# Patient Record
Sex: MALE | Race: WHITE | NOT HISPANIC OR LATINO | Employment: UNEMPLOYED | URBAN - METROPOLITAN AREA
[De-identification: names, ages, dates, MRNs, and addresses within clinical notes are randomized per-mention and may not be internally consistent; named-entity substitution may affect disease eponyms.]

---

## 2018-04-05 ENCOUNTER — HOSPITAL ENCOUNTER (EMERGENCY)
Facility: HOSPITAL | Age: 38
Discharge: HOME | End: 2018-04-05
Payer: OTHER MISCELLANEOUS

## 2018-04-05 ENCOUNTER — HOSPITAL ENCOUNTER (EMERGENCY)
Facility: HOSPITAL | Age: 38
Discharge: ANOTHER HOSPITAL | End: 2018-04-05
Attending: FAMILY MEDICINE
Payer: OTHER MISCELLANEOUS

## 2018-04-05 VITALS
WEIGHT: 175 LBS | HEART RATE: 60 BPM | OXYGEN SATURATION: 99 % | SYSTOLIC BLOOD PRESSURE: 134 MMHG | BODY MASS INDEX: 27.47 KG/M2 | DIASTOLIC BLOOD PRESSURE: 95 MMHG | HEIGHT: 67 IN | RESPIRATION RATE: 16 BRPM | TEMPERATURE: 98 F

## 2018-04-05 VITALS
HEIGHT: 69 IN | SYSTOLIC BLOOD PRESSURE: 121 MMHG | RESPIRATION RATE: 20 BRPM | HEART RATE: 57 BPM | WEIGHT: 160 LBS | TEMPERATURE: 98 F | BODY MASS INDEX: 23.7 KG/M2 | DIASTOLIC BLOOD PRESSURE: 87 MMHG

## 2018-04-05 DIAGNOSIS — S41.112A LACERATION OF LEFT UPPER EXTREMITY, INITIAL ENCOUNTER: ICD-10-CM

## 2018-04-05 DIAGNOSIS — S52.002C: ICD-10-CM

## 2018-04-05 DIAGNOSIS — Z01.810 PREOP CARDIOVASCULAR EXAM: Primary | ICD-10-CM

## 2018-04-05 DIAGNOSIS — S52.092B OTHER TYPE I OR II OPEN FRACTURE OF PROXIMAL END OF LEFT ULNA, INITIAL ENCOUNTER: Primary | ICD-10-CM

## 2018-04-05 DIAGNOSIS — S51.012A: ICD-10-CM

## 2018-04-05 LAB
ALBUMIN SERPL BCP-MCNC: 4.4 G/DL
ALBUMIN SERPL BCP-MCNC: 4.6 G/DL
ALP SERPL-CCNC: 102 U/L
ALP SERPL-CCNC: 128 U/L
ALT SERPL W/O P-5'-P-CCNC: 28 U/L
ALT SERPL W/O P-5'-P-CCNC: 30 U/L
ANION GAP SERPL CALC-SCNC: 10 MMOL/L
ANION GAP SERPL CALC-SCNC: 8 MMOL/L
APTT BLDCRRT: 29.2 SEC
AST SERPL-CCNC: 32 U/L
AST SERPL-CCNC: 35 U/L
BASOPHILS # BLD AUTO: 0.05 K/UL
BASOPHILS # BLD AUTO: 0.05 K/UL
BASOPHILS NFR BLD: 0.3 %
BASOPHILS NFR BLD: 0.4 %
BILIRUB SERPL-MCNC: 0.5 MG/DL
BILIRUB SERPL-MCNC: 0.5 MG/DL
BUN SERPL-MCNC: 14 MG/DL
BUN SERPL-MCNC: 16 MG/DL
CALCIUM SERPL-MCNC: 10 MG/DL
CALCIUM SERPL-MCNC: 9.8 MG/DL
CHLORIDE SERPL-SCNC: 101 MMOL/L
CHLORIDE SERPL-SCNC: 104 MMOL/L
CO2 SERPL-SCNC: 25 MMOL/L
CO2 SERPL-SCNC: 25 MMOL/L
CREAT SERPL-MCNC: 1 MG/DL
CREAT SERPL-MCNC: 1 MG/DL
DIFFERENTIAL METHOD: ABNORMAL
DIFFERENTIAL METHOD: ABNORMAL
EOSINOPHIL # BLD AUTO: 0 K/UL
EOSINOPHIL # BLD AUTO: 0 K/UL
EOSINOPHIL NFR BLD: 0 %
EOSINOPHIL NFR BLD: 0 %
ERYTHROCYTE [DISTWIDTH] IN BLOOD BY AUTOMATED COUNT: 12.2 %
ERYTHROCYTE [DISTWIDTH] IN BLOOD BY AUTOMATED COUNT: 12.5 %
EST. GFR  (AFRICAN AMERICAN): >60 ML/MIN/1.73 M^2
EST. GFR  (AFRICAN AMERICAN): >60 ML/MIN/1.73 M^2
EST. GFR  (NON AFRICAN AMERICAN): >60 ML/MIN/1.73 M^2
EST. GFR  (NON AFRICAN AMERICAN): >60 ML/MIN/1.73 M^2
GLUCOSE SERPL-MCNC: 82 MG/DL
GLUCOSE SERPL-MCNC: 96 MG/DL
HCT VFR BLD AUTO: 43.1 %
HCT VFR BLD AUTO: 43.7 %
HGB BLD-MCNC: 15.1 G/DL
HGB BLD-MCNC: 15.1 G/DL
IMM GRANULOCYTES # BLD AUTO: 0.03 K/UL
IMM GRANULOCYTES # BLD AUTO: 0.07 K/UL
IMM GRANULOCYTES NFR BLD AUTO: 0.3 %
IMM GRANULOCYTES NFR BLD AUTO: 0.5 %
INR PPP: 1
INR PPP: 1.1
LYMPHOCYTES # BLD AUTO: 2.8 K/UL
LYMPHOCYTES # BLD AUTO: 3.3 K/UL
LYMPHOCYTES NFR BLD: 18.7 %
LYMPHOCYTES NFR BLD: 28.9 %
MAGNESIUM SERPL-MCNC: 2.1 MG/DL
MCH RBC QN AUTO: 31.2 PG
MCH RBC QN AUTO: 31.3 PG
MCHC RBC AUTO-ENTMCNC: 34.6 G/DL
MCHC RBC AUTO-ENTMCNC: 35 G/DL
MCV RBC AUTO: 89 FL
MCV RBC AUTO: 91 FL
MONOCYTES # BLD AUTO: 0.6 K/UL
MONOCYTES # BLD AUTO: 0.8 K/UL
MONOCYTES NFR BLD: 5 %
MONOCYTES NFR BLD: 5.1 %
NEUTROPHILS # BLD AUTO: 11.3 K/UL
NEUTROPHILS # BLD AUTO: 7.6 K/UL
NEUTROPHILS NFR BLD: 65.3 %
NEUTROPHILS NFR BLD: 75.5 %
NRBC BLD-RTO: 0 /100 WBC
NRBC BLD-RTO: 0 /100 WBC
PHOSPHATE SERPL-MCNC: 2.9 MG/DL
PLATELET # BLD AUTO: 242 K/UL
PLATELET # BLD AUTO: 248 K/UL
PMV BLD AUTO: 9.8 FL
PMV BLD AUTO: 9.9 FL
POTASSIUM SERPL-SCNC: 3.6 MMOL/L
POTASSIUM SERPL-SCNC: 4.4 MMOL/L
PREALB SERPL-MCNC: 34 MG/DL
PROT SERPL-MCNC: 7.5 G/DL
PROT SERPL-MCNC: 7.8 G/DL
PROTHROMBIN TIME: 10.9 SEC
PROTHROMBIN TIME: 12.9 SEC
RBC # BLD AUTO: 4.82 M/UL
RBC # BLD AUTO: 4.84 M/UL
SODIUM SERPL-SCNC: 134 MMOL/L
SODIUM SERPL-SCNC: 139 MMOL/L
TRANSFERRIN SERPL-MCNC: 254 MG/DL
WBC # BLD AUTO: 11.56 K/UL
WBC # BLD AUTO: 14.96 K/UL

## 2018-04-05 PROCEDURE — 85730 THROMBOPLASTIN TIME PARTIAL: CPT

## 2018-04-05 PROCEDURE — 36415 COLL VENOUS BLD VENIPUNCTURE: CPT

## 2018-04-05 PROCEDURE — 96366 THER/PROPH/DIAG IV INF ADDON: CPT | Mod: 59

## 2018-04-05 PROCEDURE — 73090 X-RAY EXAM OF FOREARM: CPT | Mod: TC,FY,LT

## 2018-04-05 PROCEDURE — 93010 ELECTROCARDIOGRAM REPORT: CPT | Mod: ,,, | Performed by: INTERNAL MEDICINE

## 2018-04-05 PROCEDURE — 84100 ASSAY OF PHOSPHORUS: CPT

## 2018-04-05 PROCEDURE — 12005 RPR S/N/A/GEN/TRK12.6-20.0CM: CPT

## 2018-04-05 PROCEDURE — 85610 PROTHROMBIN TIME: CPT | Mod: 91

## 2018-04-05 PROCEDURE — 73080 X-RAY EXAM OF ELBOW: CPT | Mod: 26,LT,, | Performed by: RADIOLOGY

## 2018-04-05 PROCEDURE — 83735 ASSAY OF MAGNESIUM: CPT

## 2018-04-05 PROCEDURE — 84134 ASSAY OF PREALBUMIN: CPT

## 2018-04-05 PROCEDURE — 90471 IMMUNIZATION ADMIN: CPT | Performed by: FAMILY MEDICINE

## 2018-04-05 PROCEDURE — 85025 COMPLETE CBC W/AUTO DIFF WBC: CPT | Mod: 91

## 2018-04-05 PROCEDURE — 85610 PROTHROMBIN TIME: CPT

## 2018-04-05 PROCEDURE — 63600175 PHARM REV CODE 636 W HCPCS: Performed by: STUDENT IN AN ORGANIZED HEALTH CARE EDUCATION/TRAINING PROGRAM

## 2018-04-05 PROCEDURE — 93005 ELECTROCARDIOGRAM TRACING: CPT

## 2018-04-05 PROCEDURE — 25000003 PHARM REV CODE 250: Performed by: STUDENT IN AN ORGANIZED HEALTH CARE EDUCATION/TRAINING PROGRAM

## 2018-04-05 PROCEDURE — 96376 TX/PRO/DX INJ SAME DRUG ADON: CPT

## 2018-04-05 PROCEDURE — 96374 THER/PROPH/DIAG INJ IV PUSH: CPT

## 2018-04-05 PROCEDURE — S0077 INJECTION, CLINDAMYCIN PHOSP: HCPCS | Performed by: STUDENT IN AN ORGANIZED HEALTH CARE EDUCATION/TRAINING PROGRAM

## 2018-04-05 PROCEDURE — 80053 COMPREHEN METABOLIC PANEL: CPT

## 2018-04-05 PROCEDURE — 99285 EMERGENCY DEPT VISIT HI MDM: CPT | Mod: 25

## 2018-04-05 PROCEDURE — 96375 TX/PRO/DX INJ NEW DRUG ADDON: CPT | Mod: 59

## 2018-04-05 PROCEDURE — 73090 X-RAY EXAM OF FOREARM: CPT | Mod: 26,LT,, | Performed by: RADIOLOGY

## 2018-04-05 PROCEDURE — 84466 ASSAY OF TRANSFERRIN: CPT

## 2018-04-05 PROCEDURE — 25000003 PHARM REV CODE 250: Performed by: SURGERY

## 2018-04-05 PROCEDURE — 85025 COMPLETE CBC W/AUTO DIFF WBC: CPT

## 2018-04-05 PROCEDURE — 63600175 PHARM REV CODE 636 W HCPCS: Performed by: FAMILY MEDICINE

## 2018-04-05 PROCEDURE — 63600175 PHARM REV CODE 636 W HCPCS

## 2018-04-05 PROCEDURE — 99285 EMERGENCY DEPT VISIT HI MDM: CPT | Mod: 25,27

## 2018-04-05 PROCEDURE — 12035 INTMD RPR S/A/T/EXT 12.6-20: CPT

## 2018-04-05 PROCEDURE — 73080 X-RAY EXAM OF ELBOW: CPT | Mod: TC,FY,LT

## 2018-04-05 PROCEDURE — 96367 TX/PROPH/DG ADDL SEQ IV INF: CPT | Mod: 59

## 2018-04-05 PROCEDURE — 80053 COMPREHEN METABOLIC PANEL: CPT | Mod: 91

## 2018-04-05 PROCEDURE — 90714 TD VACC NO PRESV 7 YRS+ IM: CPT | Performed by: FAMILY MEDICINE

## 2018-04-05 PROCEDURE — 96365 THER/PROPH/DIAG IV INF INIT: CPT | Mod: 59

## 2018-04-05 RX ORDER — HYDROCODONE BITARTRATE AND ACETAMINOPHEN 5; 325 MG/1; MG/1
1 TABLET ORAL EVERY 4 HOURS PRN
Qty: 24 TABLET | Refills: 0 | Status: SHIPPED | OUTPATIENT
Start: 2018-04-05 | End: 2018-10-06

## 2018-04-05 RX ORDER — LIDOCAINE HYDROCHLORIDE 10 MG/ML
20 INJECTION INFILTRATION; PERINEURAL ONCE
Status: COMPLETED | OUTPATIENT
Start: 2018-04-05 | End: 2018-04-05

## 2018-04-05 RX ORDER — MORPHINE SULFATE 4 MG/ML
4 INJECTION, SOLUTION INTRAMUSCULAR; INTRAVENOUS
Status: COMPLETED | OUTPATIENT
Start: 2018-04-05 | End: 2018-04-05

## 2018-04-05 RX ORDER — HYDROCODONE BITARTRATE AND ACETAMINOPHEN 10; 325 MG/1; MG/1
1 TABLET ORAL ONCE
Status: COMPLETED | OUTPATIENT
Start: 2018-04-05 | End: 2018-04-05

## 2018-04-05 RX ORDER — CLINDAMYCIN HYDROCHLORIDE 150 MG/1
300 CAPSULE ORAL 4 TIMES DAILY
Qty: 80 CAPSULE | Refills: 0 | Status: SHIPPED | OUTPATIENT
Start: 2018-04-05 | End: 2018-04-05

## 2018-04-05 RX ORDER — HYDROMORPHONE HYDROCHLORIDE 2 MG/ML
0.5 INJECTION, SOLUTION INTRAMUSCULAR; INTRAVENOUS; SUBCUTANEOUS
Status: COMPLETED | OUTPATIENT
Start: 2018-04-05 | End: 2018-04-05

## 2018-04-05 RX ORDER — CLINDAMYCIN PHOSPHATE 900 MG/50ML
900 INJECTION, SOLUTION INTRAVENOUS
Status: DISCONTINUED | OUTPATIENT
Start: 2018-04-05 | End: 2018-04-05 | Stop reason: HOSPADM

## 2018-04-05 RX ORDER — FENTANYL CITRATE 50 UG/ML
50 INJECTION, SOLUTION INTRAMUSCULAR; INTRAVENOUS
Status: DISCONTINUED | OUTPATIENT
Start: 2018-04-05 | End: 2018-04-05

## 2018-04-05 RX ORDER — CLINDAMYCIN HYDROCHLORIDE 150 MG/1
450 CAPSULE ORAL 4 TIMES DAILY
Qty: 120 CAPSULE | Refills: 0 | Status: SHIPPED | OUTPATIENT
Start: 2018-04-05 | End: 2018-04-15

## 2018-04-05 RX ADMIN — HYDROCODONE BITARTRATE AND ACETAMINOPHEN 1 TABLET: 10; 325 TABLET ORAL at 09:04

## 2018-04-05 RX ADMIN — HYDROMORPHONE HYDROCHLORIDE 0.5 MG: 2 INJECTION, SOLUTION INTRAMUSCULAR; INTRAVENOUS; SUBCUTANEOUS at 12:04

## 2018-04-05 RX ADMIN — TETANUS AND DIPHTHERIA TOXOIDS ADSORBED 0.5 ML: 2; 2 INJECTION INTRAMUSCULAR at 03:04

## 2018-04-05 RX ADMIN — GENTAMICIN SULFATE 142.8 MG: 40 INJECTION, SOLUTION INTRAMUSCULAR; INTRAVENOUS at 08:04

## 2018-04-05 RX ADMIN — CLINDAMYCIN IN 5 PERCENT DEXTROSE 900 MG: 18 INJECTION, SOLUTION INTRAVENOUS at 06:04

## 2018-04-05 RX ADMIN — MORPHINE SULFATE 4 MG: 4 INJECTION INTRAVENOUS at 06:04

## 2018-04-05 RX ADMIN — LIDOCAINE HYDROCHLORIDE 20 ML: 10 INJECTION, SOLUTION INFILTRATION; PERINEURAL at 07:04

## 2018-04-05 RX ADMIN — HYDROMORPHONE HYDROCHLORIDE 0.5 MG: 2 INJECTION, SOLUTION INTRAMUSCULAR; INTRAVENOUS; SUBCUTANEOUS at 03:04

## 2018-04-05 NOTE — ED PROVIDER NOTES
Encounter Date: 4/5/2018       History     Chief Complaint   Patient presents with    Laceration     Pt presented to the ED via [facility]. Pt is a transfer from [facility] for lacteration to the left arm from a skill saw.      Anshul Mayes is a 38 y.o. male with no reported significant past medical history who presented as an ED transfer from [place] with a left elbow laceration and ulna fracture secondary to circular saw accident.  Patient reports another person was using the saw and after cutting a board swung the saw backwards hitting the patient in the arm.  His finger was not depressing the trigger but the blade was still spinning.  This occurred approximately 6 hours prior to arrival at [facility] emergency department.            Review of patient's allergies indicates:   Allergen Reactions    Penicillins Anaphylaxis    Fentanyl Nausea Only     Past Medical History:   Diagnosis Date    Hypertension      Past Surgical History:   Procedure Laterality Date    EYE SURGERY      KNEE SURGERY Left      History reviewed. No pertinent family history.  Social History   Substance Use Topics    Smoking status: Current Every Day Smoker     Packs/day: 1.00     Years: 30.00     Types: Cigarettes    Smokeless tobacco: Never Used    Alcohol use No     Review of Systems   Constitutional: Negative for activity change, chills and fever.   HENT: Negative for congestion, facial swelling, sinus pressure and tinnitus.    Eyes: Negative for photophobia and visual disturbance.   Respiratory: Negative for cough and shortness of breath.    Cardiovascular: Negative for chest pain, palpitations and leg swelling.   Gastrointestinal: Negative for abdominal distention, abdominal pain, nausea and vomiting.   Genitourinary: Negative for difficulty urinating and dysuria.   Musculoskeletal: Positive for arthralgias and myalgias.   Skin: Positive for wound.   Neurological: Negative for dizziness, facial asymmetry, weakness and  numbness.   Hematological: Negative for adenopathy. Does not bruise/bleed easily.   Psychiatric/Behavioral: Negative for agitation and behavioral problems.       Physical Exam     Initial Vitals [04/05/18 1732]   BP Pulse Resp Temp SpO2   (!) 134/95 60 16 98.4 °F (36.9 °C) 99 %      MAP       108         Physical Exam    Constitutional: He appears well-developed and well-nourished.   HENT:   Head: Normocephalic and atraumatic.   Eyes: EOM are normal. Pupils are equal, round, and reactive to light.   Neck: Normal range of motion.   Cardiovascular: Normal rate and regular rhythm. Exam reveals no gallop and no friction rub.    No murmur heard.  Pulmonary/Chest: Breath sounds normal. No respiratory distress. He has no rhonchi. He has no rales.   Abdominal: Soft. He exhibits no distension. There is no tenderness.   Musculoskeletal:   Left arm dressed.  Dressing taken down revealing approximately 15 cm x 1 cm laceration to the left extensor surface 8 cm distal to elbow.  No apparent expose bone or tendon.  Patient remains neurovascularly intact   Neurological: He is alert and oriented to person, place, and time. No cranial nerve deficit or sensory deficit.   Skin: Skin is warm and dry. Capillary refill takes less than 2 seconds.   Psychiatric: He has a normal mood and affect. Thought content normal.         ED Course   Lac Repair  Date/Time: 4/5/2018 9:07 PM  Performed by: RAYMOND CARDENAS  Authorized by: HERVE RODRIGUEZ   Body area: upper extremity  Location details: left lower arm  Laceration length: 15 cm  Tendon involvement: none  Nerve involvement: none  Vascular damage: no  Anesthesia: local infiltration    Anesthesia:  Local Anesthetic: lidocaine 1% without epinephrine  Patient sedated: no  Irrigation solution: saline  Irrigation method: jet lavage  Amount of cleaning: standard  Debridement: none  Degree of undermining: minimal  Wound skin closure material used: 2-0 nylon.  Number of sutures: 16  Technique:  simple  Approximation: close  Approximation difficulty: simple  Dressing: 4x4 sterile gauze, splint for protection and Xeroform gauze  Patient tolerance: Patient tolerated the procedure well with no immediate complications        Labs Reviewed   COMPREHENSIVE METABOLIC PANEL   CBC W/ AUTO DIFFERENTIAL   MAGNESIUM   PHOSPHORUS   PREALBUMIN   TRANSFERRIN   PROTIME-INR     Results for orders placed or performed during the hospital encounter of 04/05/18   Comprehensive metabolic panel   Result Value Ref Range    Sodium 139 136 - 145 mmol/L    Potassium 4.4 3.5 - 5.1 mmol/L    Chloride 104 95 - 110 mmol/L    CO2 25 23 - 29 mmol/L    Glucose 82 70 - 110 mg/dL    BUN, Bld 14 6 - 20 mg/dL    Creatinine 1.0 0.5 - 1.4 mg/dL    Calcium 10.0 8.7 - 10.5 mg/dL    Total Protein 7.5 6.0 - 8.4 g/dL    Albumin 4.4 3.5 - 5.2 g/dL    Total Bilirubin 0.5 0.1 - 1.0 mg/dL    Alkaline Phosphatase 128 55 - 135 U/L    AST 32 10 - 40 U/L    ALT 28 10 - 44 U/L    Anion Gap 10 8 - 16 mmol/L    eGFR if African American >60.0 >60 mL/min/1.73 m^2    eGFR if non African American >60.0 >60 mL/min/1.73 m^2   CBC auto differential   Result Value Ref Range    WBC 14.96 (H) 3.90 - 12.70 K/uL    RBC 4.82 4.60 - 6.20 M/uL    Hemoglobin 15.1 14.0 - 18.0 g/dL    Hematocrit 43.7 40.0 - 54.0 %    MCV 91 82 - 98 fL    MCH 31.3 (H) 27.0 - 31.0 pg    MCHC 34.6 32.0 - 36.0 g/dL    RDW 12.5 11.5 - 14.5 %    Platelets 248 150 - 350 K/uL    MPV 9.8 9.2 - 12.9 fL    Immature Granulocytes 0.5 0.0 - 0.5 %    Gran # (ANC) 11.3 (H) 1.8 - 7.7 K/uL    Immature Grans (Abs) 0.07 (H) 0.00 - 0.04 K/uL    Lymph # 2.8 1.0 - 4.8 K/uL    Mono # 0.8 0.3 - 1.0 K/uL    Eos # 0.0 0.0 - 0.5 K/uL    Baso # 0.05 0.00 - 0.20 K/uL    nRBC 0 0 /100 WBC    Gran% 75.5 (H) 38.0 - 73.0 %    Lymph% 18.7 18.0 - 48.0 %    Mono% 5.0 4.0 - 15.0 %    Eosinophil% 0.0 0.0 - 8.0 %    Basophil% 0.3 0.0 - 1.9 %    Differential Method Automated    Magnesium   Result Value Ref Range    Magnesium 2.1 1.6 -  2.6 mg/dL   Phosphorus   Result Value Ref Range    Phosphorus 2.9 2.7 - 4.5 mg/dL   Prealbumin   Result Value Ref Range    Prealbumin 34 20 - 43 mg/dL   Transferrin   Result Value Ref Range    Transferrin 254 200 - 375 mg/dL   Protime-INR   Result Value Ref Range    Prothrombin Time 10.9 9.0 - 12.5 sec    INR 1.0 0.8 - 1.2     Imaging Results          X-Ray Chest 1 View Pre-OP (Final result)  Result time 04/05/18 19:25:03    Final result by Davin Dumont MD (04/05/18 19:25:03)                 Impression:      No acute cardiopulmonary finding.      Electronically signed by: Daivn Dumont MD  Date:    04/05/2018  Time:    19:25             Narrative:    EXAMINATION:  XR CHEST 1 VIEW PRE-OP    CLINICAL HISTORY:  Preop;    TECHNIQUE:  Single frontal view of the chest was performed.    COMPARISON:  None    FINDINGS:  Cardiac silhouette is not enlarged.  No focal consolidation.  No sizable pleural effusion.  No pneumothorax.                               X-Ray Forearm Left (Final result)  Result time 04/05/18 19:22:27    Final result by Joseph Abdalla MD (04/05/18 19:22:27)                 Impression:      1. Soft tissue deformity involving the dorsal aspect of the proximal forearm with underlying osseous injury/disruption.  No dislocation.      Electronically signed by: Joseph Abdalla MD  Date:    04/05/2018  Time:    19:22             Narrative:    EXAMINATION:  XR FOREARM LEFT    CLINICAL HISTORY:  pain;    TECHNIQUE:  AP and lateral views of the left forearm were performed.    COMPARISON:  None    FINDINGS:  Two views.    Bandaging material overlies the proximal forearm, limiting evaluation.  There is marked soft tissue disruption involving the dorsal aspect of the proximal forearm.  There is an osseous defect involving the proximal aspect of the ulna.  No dislocation.  No convincing radiopaque foreign body.                               X-Ray Wrist Complete Left (Final result)  Result time 04/05/18  19:23:49    Final result by Joseph Abdalla MD (04/05/18 19:23:49)                 Impression:      No acute displaced fracture or dislocation of the wrist, please see above for details of the scaphoid.      Electronically signed by: Joseph Abdalla MD  Date:    04/05/2018  Time:    19:23             Narrative:    EXAMINATION:  XR WRIST COMPLETE 3 VIEWS LEFT    CLINICAL HISTORY:  pain;    TECHNIQUE:  PA, lateral, and oblique views of the left wrist were performed.    COMPARISON:  None    FINDINGS:  Three views.    No acute displaced fracture or dislocation of the wrist.  No radiopaque foreign body.  There is a relatively radiolucent lesion within the thenar aspect of the scaphoid, that approaches the cortical surface,, could reflect cyst or related to degenerative change.  No significant edema.                               X-Ray Elbow Complete Left (Final result)  Result time 04/05/18 19:24:49    Final result by Joseph Abdalla MD (04/05/18 19:24:49)                 Impression:      As above      Electronically signed by: Joseph Abdalla MD  Date:    04/05/2018  Time:    19:24             Narrative:    EXAMINATION:  XR ELBOW COMPLETE 3 VIEW LEFT    CLINICAL HISTORY:  pain;    TECHNIQUE:  AP, lateral, and oblique views of the left elbow were performed.    COMPARISON:  None    FINDINGS:  Three views.    There is a cortical defect involving the proximal aspect of the ulna dorsally, in the region of soft tissue disruption.  No dislocation of the elbow.  Evaluation for radiopaque foreign body is limited given overlying bandaging.  No acute displaced fracture or dislocation allowing for ulnar defect.                                            APC / Resident Notes:   Anshul Mayes is a 38 y.o. male presenting as a transfer from [place] after sustaining large laceration to the extensor surface of the left forearm with underlying disruption of the cortex of the ulna.  Bleeding controlled.  Orthopaedic surgery  consulted and evaluated the patient.  Started on clindamycin and gentamicin due to penicillin allergy.  Preoperative labs ordered by Orthopaedic surgery.      9:03 PM  XR reviewed by ortho and felt that the injury did not require operative management at this time.  Wound was anesthetized with 22 cc of 1% lidocaine.  Wound was then scrubbed with gauze and betadine solution then irrigated with 4 L of sterile water.  Wound closed with 2-0 nylon interrupted sutures.  Patient to be provided with 10 days of clindamycin and 24 Norco 5/325 upon discharge.  Instructed to follow up with orthopedist within 7 days.  Patient voiced understanding.       I have reviewed and concur with the Resident's  history, physical, assessment, and plan.  I have personally interviewed and examined the patient at bedside.  The pt is here with deep laceration of left forearm down through cortex of left proximal ulna 2/2 chainsaw injury.  Consulted to orthopedics who assisted with copious irrigation at the bedside and suture closure of the wound.  Patient discharged on antibiotics to follow-up in orthopedic clinic in 7 days.  I discussed the physical findings, lab findings, radiological findings diagnosis and plan with the patient and they have expressed understanding and agreement with this management.      Attending Attestation:   Physician Attestation Statement for Resident:  As the supervising MD I was personally present during the critical portions of the procedure(s) performed by the resident and was immediately available in the ED to provide services and assistance as needed during the entire procedure.                       Clinical Impression:   The primary encounter diagnosis was Preop cardiovascular exam. Diagnoses of Laceration of left upper extremity, initial encounter and Type III open fracture of proximal end of left ulna, unspecified fracture morphology, initial encounter were also pertinent to this visit.    Disposition:    Disposition: Discharged  Condition: Stable                        Lashaun Veronica MD  04/05/18 8400

## 2018-04-05 NOTE — ED TRIAGE NOTES
Pt transfer from [facility], states he was at working holding a board that was being cut with a skill saw and his boss swung the saw backwards and cut him. Wrap dressing around laceration

## 2018-04-05 NOTE — ED PROVIDER NOTES
Encounter Date: 4/5/2018       History     Chief Complaint   Patient presents with    Laceration     Pt cut left forearm with a circular saw just PTA. Only complaints is pain at laceration site. Bleeding controlled.           Review of patient's allergies indicates:   Allergen Reactions    Penicillins Anaphylaxis     Past Medical History:   Diagnosis Date    Hypertension      Past Surgical History:   Procedure Laterality Date    EYE SURGERY      KNEE SURGERY Left      No family history on file.  Social History   Substance Use Topics    Smoking status: Current Every Day Smoker     Packs/day: 1.00     Types: Cigarettes    Smokeless tobacco: Never Used    Alcohol use No     Review of Systems   Constitutional: Negative.    HENT: Negative.    Eyes: Negative.    Respiratory: Negative.    Cardiovascular: Negative.    Gastrointestinal: Negative.    Endocrine: Negative.    Genitourinary: Negative.    Musculoskeletal: Negative.    Skin:        Laceration to arm   Neurological: Negative.    Hematological: Negative.    Psychiatric/Behavioral: Negative.        Physical Exam     Initial Vitals   BP Pulse Resp Temp SpO2   -- -- -- -- --      MAP       --         Physical Exam    Constitutional: He appears well-developed and well-nourished. He is not diaphoretic. No distress.   HENT:   Head: Normocephalic and atraumatic.   Eyes: Conjunctivae and EOM are normal. Pupils are equal, round, and reactive to light.   Neck: Normal range of motion. Neck supple.   Cardiovascular: Normal rate, regular rhythm and normal heart sounds.   Pulmonary/Chest: Breath sounds normal. No respiratory distress.   Abdominal: Soft. Bowel sounds are normal.   Musculoskeletal:   Approx. 12 inch laceration to left forearm extending into muscle belly. No exposed tendon noted, bleeding minimal. No exposed bone seen.    Skin: Capillary refill takes less than 2 seconds.   Psychiatric: He has a normal mood and affect. His behavior is normal. Judgment and  thought content normal.         ED Course   Procedures  Labs Reviewed   CBC W/ AUTO DIFFERENTIAL - Abnormal; Notable for the following:        Result Value    MCH 31.2 (*)     All other components within normal limits   COMPREHENSIVE METABOLIC PANEL - Abnormal; Notable for the following:     Sodium 134 (*)     All other components within normal limits   PROTIME-INR - Abnormal; Notable for the following:     Prothrombin Time 12.9 (*)     All other components within normal limits   APTT         Imaging Results          X-Ray Elbow Complete Left (Final result)  Result time 04/05/18 13:47:29    Final result by Edward Odell MD (04/05/18 13:47:29)                 Narrative:    EXAMINATION:  XR FOREARM LEFT; XR ELBOW COMPLETE 3 VIEW LEFT    CLINICAL HISTORY:  Laceration without foreign body of left elbow, initial encounter    TECHNIQUE:  AP and lateral views of the left forearm were performed.  Three views of the elbow were also performed    COMPARISON:  None    FINDINGS:  There is a comminuted fracture involving the posterior cortex of the proximal ulna with associated soft tissue injury.  This is centered 7.7 cm from the olecranon. There is associated soft tissue injury.  I do not see evidence of elbow effusion.  Views of the left forearm are otherwise unremarkable.      Electronically signed by: Edward Odell MD  Date:    04/05/2018  Time:    13:47                             X-Ray Forearm Left (Final result)  Result time 04/05/18 13:47:29    Final result by Edward Odell MD (04/05/18 13:47:29)                 Narrative:    EXAMINATION:  XR FOREARM LEFT; XR ELBOW COMPLETE 3 VIEW LEFT    CLINICAL HISTORY:  Laceration without foreign body of left elbow, initial encounter    TECHNIQUE:  AP and lateral views of the left forearm were performed.  Three views of the elbow were also performed    COMPARISON:  None    FINDINGS:  There is a comminuted fracture involving the posterior cortex of the proximal ulna with  associated soft tissue injury.  This is centered 7.7 cm from the olecranon. There is associated soft tissue injury.  I do not see evidence of elbow effusion.  Views of the left forearm are otherwise unremarkable.      Electronically signed by: Edward Odell MD  Date:    04/05/2018  Time:    13:47                                Labs Reviewed   CBC W/ AUTO DIFFERENTIAL - Abnormal; Notable for the following:        Result Value    MCH 31.2 (*)     All other components within normal limits   COMPREHENSIVE METABOLIC PANEL - Abnormal; Notable for the following:     Sodium 134 (*)     All other components within normal limits   PROTIME-INR - Abnormal; Notable for the following:     Prothrombin Time 12.9 (*)     All other components within normal limits   APTT      [facility] referral line called for transfer.    Pt accepted to [facility] in [place]                 Clinical Impression:   The primary encounter diagnosis was Other type I or II open fracture of proximal end of left ulna, initial encounter. A diagnosis of Laceration of elbow, left, complicated was also pertinent to this visit.                           Aliza Blackman MD  04/05/18 1513       Aliza Blackman MD  04/05/18 1528

## 2018-04-05 NOTE — ED NOTES
Accepted [facility] ED to ED with Ortho accepting Dr. Menchaca with Dr. Wren as ER accepting.  # to call report [phone]

## 2018-04-06 NOTE — DISCHARGE INSTRUCTIONS
You will be discharged with 10 days of an antibiotic called Clindamycin.  You will need to follow up with an orthopedist in within 7 days for a wound check.  Please return to this or your local emergency department for fevers >100.4, uncontrollable pain, swelling or purulent/ foul smelling drainage from the wound

## 2018-04-06 NOTE — CONSULTS
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 male with PMH of smoking presenting with left elbow pain. Patient was at work and had his left elbow hit with circular saw. Immediate laceration pain. Denies head injury or LOC. Denies other MSK pains. Denies numbness, tingling, or paresthesias to extremity. Tetanus given prior to transfer. Ciprofloxacin and gentamycin ordered upon arrival.      Past Medical History:   Diagnosis Date    Hypertension        Past Surgical History:   Procedure Laterality Date    EYE SURGERY      KNEE SURGERY Left        History reviewed. No pertinent family history.    Social History     Social History    Marital status:      Spouse name: N/A    Number of children: N/A    Years of education: N/A     Social History Main Topics    Smoking status: Current Every Day Smoker     Packs/day: 1.00     Years: 30.00     Types: Cigarettes    Smokeless tobacco: Never Used    Alcohol use No    Drug use: No    Sexual activity: Not Asked     Other Topics Concern    None     Social History Narrative    None       Current Facility-Administered Medications   Medication Dose Route Frequency Provider Last Rate Last Dose    clindamycin 900 MG/50 ML D5W 900 mg/50 mL IVPB 900 mg  900 mg Intravenous Q6H French Victor MD 50 mL/hr at 04/05/18 1840 900 mg at 04/05/18 1840    gentamicin (GARAMYCIN) 142.8 mg in sodium chloride 0.9% 100 mL IVPB  2 mg/kg (Adjusted) Intravenous Once French Victor MD         Current Outpatient Prescriptions   Medication Sig Dispense Refill    diclofenac (VOLTAREN) 75 MG EC tablet Take 1 tablet (75 mg total) by mouth 2 (two) times daily. 60 tablet 1       Review of patient's allergies indicates:   Allergen Reactions    Penicillins Anaphylaxis    Fentanyl Nausea Only         Review of Systems:  ROS: Patient denies constitutional symptoms, cardiac symptoms,  "respiratory symptoms, GI symptoms. The remainder of the musculoskeletal ROS is included in the HPI.      OBJECTIVE:     Vital Signs (Most Recent)  Vitals:    04/05/18 1732   BP: (!) 134/95   Pulse: 60   Resp: 16   Temp: 98.4 °F (36.9 °C)   TempSrc: Oral   SpO2: 99%   Weight: 79.4 kg (175 lb)   Height: 5' 7" (1.702 m)         Physical Exam:  Constitutional:  NAD; well-developed and well-nourished  Pulmonary/Chest: Effort normal  Skin: Warm and dry  Neuro: Alert and oriented to person, place, and time; no focal numbness or weakness  RUE:  14 cm laceration obliquely over proximal ulna through skin, subcutaneous soft tissue, fascia   Exposed muscle belly   No visible tendons, able to palpate bone    Mild swelling around wrist  No tenderness to palpation of proximal, middle, or distal aspects of humerus  No tenderness to palpation of distal  or middle aspects of radius or ulna  No tenderness to palpation of hand  Compartments soft  Painless ROM shoulder and wrist  Normal strength in wrist dorsiflexion, palmar flexion, radial and ulnar deviation  FDS, FDP, and extensor mechanism intact to each digit  FPL and EPL intact  SILT M/U/R  Motor intact AIN/PIN/M/U/R  2+ radial  Brisk capillary refill       Diagnostic Results:  X-Ray: Reviewed right elbow, forearm, wrist: soft tissue defect over proximal ulna with small fracture off of proximal ulna    ASSESSMENT/PLAN:     38 y.o. male with left proximal ulnar forearm laceration with small fracture of ulna  - Irrigated and repaired in ER  - Placed in sugartong splint  - PO ciprofloxacin for 10 days  - Discussed follow-up options. [facility]  - [facility] in 7 days    French Victor MD PGY-2  Orthopedic Surgery    Procedure Note: left proximal forearm laceration repair  Patient and wife were explained risks, benefits, and alternatives to treatment and verbalized consent to proceed. Time out was performed and " patient name, , site, and procedure were confirmed. 20 cc of 1% lidocaine was injected locally to soft tissue after local cleaning with alcohol swabs. Laceration was cleansed with betadine and draped with blue towels. Wound was probed and no other defect was noted. Wound was irrigated with 3L of normal saline. Laceration was closed with simple interrupted 2-0 nylon suture. Wound was dressed with sugartong splint and xeroform, 4x4, cast padding, splint material, ACE bandage. No complications were noted.

## 2018-04-06 NOTE — ED NOTES
Sling applied and discharge instructions reviewed. Ready for discharge upon IV infusion completion

## 2018-04-12 ENCOUNTER — OFFICE VISIT (OUTPATIENT)
Dept: ORTHOPEDICS | Facility: CLINIC | Age: 38
End: 2018-04-12
Payer: OTHER MISCELLANEOUS

## 2018-04-12 VITALS
BODY MASS INDEX: 27.47 KG/M2 | HEIGHT: 67 IN | DIASTOLIC BLOOD PRESSURE: 75 MMHG | SYSTOLIC BLOOD PRESSURE: 109 MMHG | WEIGHT: 175 LBS | HEART RATE: 66 BPM

## 2018-04-12 DIAGNOSIS — S51.819A: Primary | ICD-10-CM

## 2018-04-12 PROCEDURE — 99203 OFFICE O/P NEW LOW 30 MIN: CPT | Mod: S$GLB,,, | Performed by: ORTHOPAEDIC SURGERY

## 2018-04-12 PROCEDURE — 99999 PR PBB SHADOW E&M-EST. PATIENT-LVL III: CPT | Mod: PBBFAC,,, | Performed by: ORTHOPAEDIC SURGERY
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
## 2018-04-17 ENCOUNTER — TELEPHONE (OUTPATIENT)
Dept: ORTHOPEDICS | Facility: CLINIC | Age: 38
End: 2018-04-17

## 2018-04-19 ENCOUNTER — OFFICE VISIT (OUTPATIENT)
Dept: ORTHOPEDICS | Facility: CLINIC | Age: 38
End: 2018-04-19
Payer: OTHER MISCELLANEOUS

## 2018-04-19 VITALS
WEIGHT: 175 LBS | DIASTOLIC BLOOD PRESSURE: 73 MMHG | HEART RATE: 65 BPM | SYSTOLIC BLOOD PRESSURE: 125 MMHG | HEIGHT: 67 IN | BODY MASS INDEX: 27.47 KG/M2

## 2018-04-19 DIAGNOSIS — S51.812D FOREARM LACERATION, LEFT, SUBSEQUENT ENCOUNTER: Primary | ICD-10-CM

## 2018-04-19 PROCEDURE — 99213 OFFICE O/P EST LOW 20 MIN: CPT | Mod: S$GLB,,, | Performed by: ORTHOPAEDIC SURGERY

## 2018-04-19 PROCEDURE — 99999 PR PBB SHADOW E&M-EST. PATIENT-LVL III: CPT | Mod: PBBFAC,,, | Performed by: ORTHOPAEDIC SURGERY
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
## 2018-05-03 ENCOUNTER — OFFICE VISIT (OUTPATIENT)
Dept: ORTHOPEDICS | Facility: CLINIC | Age: 38
End: 2018-05-03
Payer: OTHER MISCELLANEOUS

## 2018-05-03 VITALS
HEIGHT: 67 IN | WEIGHT: 175 LBS | DIASTOLIC BLOOD PRESSURE: 66 MMHG | SYSTOLIC BLOOD PRESSURE: 112 MMHG | HEART RATE: 64 BPM | BODY MASS INDEX: 27.47 KG/M2

## 2018-05-03 DIAGNOSIS — S51.812D FOREARM LACERATION, LEFT, SUBSEQUENT ENCOUNTER: Primary | ICD-10-CM

## 2018-05-03 PROCEDURE — 99999 PR PBB SHADOW E&M-EST. PATIENT-LVL III: CPT | Mod: PBBFAC,,, | Performed by: ORTHOPAEDIC SURGERY

## 2018-05-03 PROCEDURE — 99213 OFFICE O/P EST LOW 20 MIN: CPT | Mod: S$GLB,,, | Performed by: ORTHOPAEDIC SURGERY
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
## 2018-05-18 ENCOUNTER — TELEPHONE (OUTPATIENT)
Dept: ORTHOPEDICS | Facility: CLINIC | Age: 38
End: 2018-05-18

## 2018-05-18 NOTE — TELEPHONE ENCOUNTER
----- Message from Krissy Vicente sent at 5/18/2018  8:25 AM CDT -----  Contact: Anshul  Patient is asking for an x-ray to check healing process as left arm still hurts; can not use to pick self up out of bed and has limited use depending on way turns or uses arm. Please call [phone]. Thanks!

## 2018-05-21 ENCOUNTER — OFFICE VISIT (OUTPATIENT)
Dept: ORTHOPEDICS | Facility: CLINIC | Age: 38
End: 2018-05-21
Payer: OTHER MISCELLANEOUS

## 2018-05-21 ENCOUNTER — HOSPITAL ENCOUNTER (OUTPATIENT)
Dept: RADIOLOGY | Facility: HOSPITAL | Age: 38
Discharge: HOME | End: 2018-05-21
Attending: ORTHOPAEDIC SURGERY
Payer: OTHER MISCELLANEOUS

## 2018-05-21 ENCOUNTER — TELEPHONE (OUTPATIENT)
Dept: ORTHOPEDICS | Facility: CLINIC | Age: 38
End: 2018-05-21

## 2018-05-21 VITALS
BODY MASS INDEX: 27.47 KG/M2 | SYSTOLIC BLOOD PRESSURE: 137 MMHG | DIASTOLIC BLOOD PRESSURE: 86 MMHG | HEIGHT: 67 IN | HEART RATE: 72 BPM | WEIGHT: 175 LBS

## 2018-05-21 DIAGNOSIS — M79.602 LEFT ARM PAIN: ICD-10-CM

## 2018-05-21 DIAGNOSIS — M79.602 LEFT ARM PAIN: Primary | ICD-10-CM

## 2018-05-21 DIAGNOSIS — S52.292E: Primary | ICD-10-CM

## 2018-05-21 PROCEDURE — 99213 OFFICE O/P EST LOW 20 MIN: CPT | Mod: S$GLB,,, | Performed by: ORTHOPAEDIC SURGERY

## 2018-05-21 PROCEDURE — 99999 PR PBB SHADOW E&M-EST. PATIENT-LVL III: CPT | Mod: PBBFAC,,, | Performed by: ORTHOPAEDIC SURGERY

## 2018-05-21 PROCEDURE — 73090 X-RAY EXAM OF FOREARM: CPT | Mod: 26,LT,, | Performed by: RADIOLOGY

## 2018-05-21 PROCEDURE — 73090 X-RAY EXAM OF FOREARM: CPT | Mod: TC,PN,LT

## 2018-05-21 NOTE — TELEPHONE ENCOUNTER
----- Message from Eloisa Gomez sent at 5/18/2018  4:20 PM CDT -----  Please call patient in reference to an x-ray of his arm.  Call [phone]
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
## 2018-05-23 ENCOUNTER — TELEPHONE (OUTPATIENT)
Dept: ORTHOPEDICS | Facility: CLINIC | Age: 38
End: 2018-05-23

## 2018-05-23 NOTE — TELEPHONE ENCOUNTER
Called pt and advised we will send a note to WC advising of new work status. PT verbalized understanding.

## 2018-05-23 NOTE — TELEPHONE ENCOUNTER
----- Message from Hali Callahan sent at 5/23/2018  1:12 PM CDT -----  Contact: pt  Pt calling states need the office to sign /sned some papers to worker comp saying the bone was not heal,please call the pt to explain in detail more...[phone] (home)

## 2018-05-31 ENCOUNTER — TELEPHONE (OUTPATIENT)
Dept: ORTHOPEDICS | Facility: CLINIC | Age: 38
End: 2018-05-31

## 2018-05-31 NOTE — TELEPHONE ENCOUNTER
Called and advised patient that Dr. Serrano approved his request to be off of work for another 2 weeks. He verbalized understanding. Will fax paperwork to .

## 2018-05-31 NOTE — TELEPHONE ENCOUNTER
----- Message from Carrie Dominguez sent at 5/31/2018  8:53 AM CDT -----  Contact: self   Patient want to speak with a nurse regarding extended his workers comp please call back at [phone]

## 2018-05-31 NOTE — TELEPHONE ENCOUNTER
Patient is requesting to be off of work for 2 more weeks. Please advise if this is okay. He was supposed to go back to work on Monday.    Thanks

## 2018-06-14 ENCOUNTER — HOSPITAL ENCOUNTER (OUTPATIENT)
Dept: RADIOLOGY | Facility: HOSPITAL | Age: 38
Discharge: HOME | End: 2018-06-14
Attending: ORTHOPAEDIC SURGERY
Payer: OTHER MISCELLANEOUS

## 2018-06-14 ENCOUNTER — OFFICE VISIT (OUTPATIENT)
Dept: ORTHOPEDICS | Facility: CLINIC | Age: 38
End: 2018-06-14
Payer: OTHER MISCELLANEOUS

## 2018-06-14 ENCOUNTER — TELEPHONE (OUTPATIENT)
Dept: ORTHOPEDICS | Facility: CLINIC | Age: 38
End: 2018-06-14

## 2018-06-14 VITALS
HEART RATE: 73 BPM | SYSTOLIC BLOOD PRESSURE: 115 MMHG | HEIGHT: 67 IN | WEIGHT: 175 LBS | BODY MASS INDEX: 27.47 KG/M2 | DIASTOLIC BLOOD PRESSURE: 81 MMHG

## 2018-06-14 DIAGNOSIS — M79.632 LEFT FOREARM PAIN: ICD-10-CM

## 2018-06-14 DIAGNOSIS — M79.632 LEFT FOREARM PAIN: Primary | ICD-10-CM

## 2018-06-14 DIAGNOSIS — S52.292D OTHER CLOSED FRACTURE OF SHAFT OF LEFT ULNA WITH ROUTINE HEALING, SUBSEQUENT ENCOUNTER: Primary | ICD-10-CM

## 2018-06-14 PROCEDURE — 73090 X-RAY EXAM OF FOREARM: CPT | Mod: 26,LT,, | Performed by: RADIOLOGY

## 2018-06-14 PROCEDURE — 73090 X-RAY EXAM OF FOREARM: CPT | Mod: TC,PN,LT

## 2018-06-14 PROCEDURE — 99213 OFFICE O/P EST LOW 20 MIN: CPT | Mod: S$GLB,,, | Performed by: ORTHOPAEDIC SURGERY

## 2018-06-14 PROCEDURE — 99999 PR PBB SHADOW E&M-EST. PATIENT-LVL III: CPT | Mod: PBBFAC,,, | Performed by: ORTHOPAEDIC SURGERY

## 2018-06-14 NOTE — TELEPHONE ENCOUNTER
Called and spoke with patient and she stated that he needed to come in today for an xray so he can get released back to work. Appointment made today for 10:45am.

## 2018-06-14 NOTE — TELEPHONE ENCOUNTER
----- Message from Krissy Kirby sent at 6/14/2018  9:23 AM CDT -----  Contact: self  Patient [phone] is calling to speak with nurse/his workmen's comp is to stop tomorrow but he is still having pain in his left arm/he is wanting to discuss this as he does not feel he is ready to go back to work/could another xray be ordered for him either today or tomorrow to check this?/please advise
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
## 2018-07-16 ENCOUNTER — HOSPITAL ENCOUNTER (OUTPATIENT)
Dept: RADIOLOGY | Facility: HOSPITAL | Age: 38
Discharge: HOME | End: 2018-07-16
Attending: ORTHOPAEDIC SURGERY
Payer: OTHER MISCELLANEOUS

## 2018-07-16 ENCOUNTER — OFFICE VISIT (OUTPATIENT)
Dept: ORTHOPEDICS | Facility: CLINIC | Age: 38
End: 2018-07-16
Payer: OTHER MISCELLANEOUS

## 2018-07-16 VITALS
HEART RATE: 73 BPM | SYSTOLIC BLOOD PRESSURE: 139 MMHG | WEIGHT: 175 LBS | HEIGHT: 67 IN | DIASTOLIC BLOOD PRESSURE: 84 MMHG | BODY MASS INDEX: 27.47 KG/M2

## 2018-07-16 DIAGNOSIS — S51.812D LACERATION OF LEFT FOREARM WITH TENDON INVOLVEMENT, SUBSEQUENT ENCOUNTER: Primary | ICD-10-CM

## 2018-07-16 DIAGNOSIS — M79.632 LEFT FOREARM PAIN: ICD-10-CM

## 2018-07-16 DIAGNOSIS — M79.632 LEFT FOREARM PAIN: Primary | ICD-10-CM

## 2018-07-16 DIAGNOSIS — S56.922D LACERATION OF LEFT FOREARM WITH TENDON INVOLVEMENT, SUBSEQUENT ENCOUNTER: Primary | ICD-10-CM

## 2018-07-16 PROCEDURE — 99999 PR PBB SHADOW E&M-EST. PATIENT-LVL III: CPT | Mod: PBBFAC,,, | Performed by: ORTHOPAEDIC SURGERY

## 2018-07-16 PROCEDURE — 99213 OFFICE O/P EST LOW 20 MIN: CPT | Mod: S$GLB,,, | Performed by: ORTHOPAEDIC SURGERY

## 2018-07-16 PROCEDURE — 73090 X-RAY EXAM OF FOREARM: CPT | Mod: 26,LT,, | Performed by: RADIOLOGY

## 2018-07-16 PROCEDURE — 73090 X-RAY EXAM OF FOREARM: CPT | Mod: TC,PN,LT
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
## 2018-07-17 ENCOUNTER — TELEPHONE (OUTPATIENT)
Dept: ORTHOPEDICS | Facility: CLINIC | Age: 38
End: 2018-07-17

## 2018-07-17 NOTE — TELEPHONE ENCOUNTER
----- Message from Lita Garcia RT sent at 7/17/2018  8:21 AM CDT -----  Contact: pt    pt , requesting a call back soon to check the status of his information for workman's compensation, thanks.

## 2018-10-06 ENCOUNTER — HOSPITAL ENCOUNTER (EMERGENCY)
Facility: HOSPITAL | Age: 38
Discharge: HOME | End: 2018-10-06
Attending: EMERGENCY MEDICINE

## 2018-10-06 VITALS
HEIGHT: 69 IN | RESPIRATION RATE: 16 BRPM | TEMPERATURE: 99 F | WEIGHT: 160 LBS | SYSTOLIC BLOOD PRESSURE: 104 MMHG | OXYGEN SATURATION: 97 % | BODY MASS INDEX: 23.7 KG/M2 | HEART RATE: 63 BPM | DIASTOLIC BLOOD PRESSURE: 70 MMHG

## 2018-10-06 DIAGNOSIS — S50.12XA CONTUSION OF LEFT FOREARM, INITIAL ENCOUNTER: Primary | ICD-10-CM

## 2018-10-06 DIAGNOSIS — R52 PAIN: ICD-10-CM

## 2018-10-06 PROCEDURE — 25000003 PHARM REV CODE 250: Performed by: NURSE PRACTITIONER

## 2018-10-06 PROCEDURE — 99283 EMERGENCY DEPT VISIT LOW MDM: CPT | Mod: 25

## 2018-10-06 RX ORDER — HYDROCODONE BITARTRATE AND ACETAMINOPHEN 5; 325 MG/1; MG/1
1 TABLET ORAL
Status: COMPLETED | OUTPATIENT
Start: 2018-10-06 | End: 2018-10-06

## 2018-10-06 RX ORDER — MUPIROCIN 20 MG/G
1 OINTMENT TOPICAL
Status: COMPLETED | OUTPATIENT
Start: 2018-10-06 | End: 2018-10-06

## 2018-10-06 RX ORDER — HYDROCODONE BITARTRATE AND ACETAMINOPHEN 5; 325 MG/1; MG/1
1 TABLET ORAL EVERY 6 HOURS PRN
Qty: 12 TABLET | Refills: 0 | Status: SHIPPED | OUTPATIENT
Start: 2018-10-06 | End: 2020-08-11 | Stop reason: CLARIF

## 2018-10-06 RX ADMIN — MUPIROCIN 22 G: 20 OINTMENT TOPICAL at 08:10

## 2018-10-06 RX ADMIN — HYDROCODONE BITARTRATE AND ACETAMINOPHEN 1 TABLET: 5; 325 TABLET ORAL at 07:10

## 2018-10-07 NOTE — ED PROVIDER NOTES
"Encounter Date: 10/6/2018    SCRIBE #1 NOTE: I, Timothy Meghan, am scribing for, and in the presence of, Gaby CONNER.       History     Chief Complaint   Patient presents with    Arm Injury     Pt had initial injury to arm in April from skill saw accident.  Bumped arm today and not site is painful and suture sticking out      Time seen by provider: 7:51 PM on 10/06/2018      Anshul Mayes is a 38 y.o. male with a PSHx of left arm surgery who presents to the ED for further evaluation of reoccurring left arm pain that worsened < 3 hours PTA. Patient states that he was walking, when he accidentally bumped into his door frame of his surgically repaired left forearm. He admits that  1 month ago he had surgery by  to "shave a bone down because someone chipped the bone with a skill saw." He states that this occurred 4 months ago. Patient states that after he bumped his arm he noticed a supposed "disolvable suture" protruding from his surgical scar. He states that he attempted to pull the suture string out and had sharp deep pain. Patient reports that he feels like the area is swollen. He admits that the area is sensitive to touch. Patient denies fever, iv drug use, fever, rash, and drainage. Patient denies a social history of smoking 1 pack of cigarettes a day.       The history is provided by the patient.     Review of patient's allergies indicates:   Allergen Reactions    Penicillins Anaphylaxis    Fentanyl Nausea Only     Past Medical History:   Diagnosis Date    Hypertension      Past Surgical History:   Procedure Laterality Date    EYE SURGERY      KNEE SURGERY Left      History reviewed. No pertinent family history.  Social History     Tobacco Use    Smoking status: Current Every Day Smoker     Packs/day: 1.00     Years: 30.00     Pack years: 30.00     Types: Cigarettes    Smokeless tobacco: Never Used   Substance Use Topics    Alcohol use: No    Drug use: No     Review of Systems "   Constitutional: Negative for fever.   HENT: Negative for congestion.    Eyes: Negative for visual disturbance.   Respiratory: Negative for shortness of breath.    Cardiovascular: Negative for chest pain.   Gastrointestinal: Negative for abdominal pain.   Musculoskeletal: Positive for arthralgias and joint swelling.   Skin: Positive for wound. Negative for rash.   Neurological: Negative for weakness, numbness and headaches.       Physical Exam     Initial Vitals [10/06/18 1830]   BP Pulse Resp Temp SpO2   104/70 63 16 98.6 °F (37 °C) 97 %      MAP       --         Physical Exam    Nursing note and vitals reviewed.  Constitutional: Vital signs are normal. He appears well-developed and well-nourished.   HENT:   Head: Normocephalic and atraumatic.   Eyes: Pupils are equal, round, and reactive to light.   Neck: Neck supple.   Cardiovascular: Normal rate, regular rhythm, normal heart sounds and intact distal pulses. Exam reveals no gallop and no friction rub.    No murmur heard.  Pulses:       Radial pulses are 2+ on the right side, and 2+ on the left side.   Pulmonary/Chest: Breath sounds normal. He has no wheezes. He has no rhonchi. He has no rales.   Abdominal: Normal appearance.   Musculoskeletal: He exhibits tenderness. He exhibits no edema.        Left elbow: Normal. He exhibits normal range of motion, no swelling, no effusion, no deformity and no laceration. No tenderness found. No radial head, no medial epicondyle, no lateral epicondyle and no olecranon process tenderness noted.        Left wrist: Normal.        Left forearm: He exhibits tenderness and bony tenderness. He exhibits no swelling, no edema, no deformity and no laceration.   Normal flexion and extension of left wrist.  + 2 LUE radial pulse.    Neurological: He is alert and oriented to person, place, and time. He has normal strength. No sensory deficit.   Bilateral upper extremities 5/5 strength and normal sensation with the exception of chronic  paraesthesia over scar. No sign neurovascular compromise.    Skin: Skin is warm and dry.   Left forearm proximal ulnar surface with visible suture penetrating thru surgical scar. No drainage, swelling, or erythema. See image below.    Psychiatric: He has a normal mood and affect. His speech is normal and behavior is normal.             ED Course   Procedures  Labs Reviewed - No data to display       Imaging Results          X-Ray Forearm Left (In process)                  Medical Decision Making:   History:   Old Medical Records: I decided to obtain old medical records.  Differential Diagnosis:   Fracture, foreign body, and contusion.   Clinical Tests:   Radiological Study: Ordered and Reviewed       APC / Resident Notes:   Patient is a 38 y.o. male who presents to the ED 10/06/2018 who underwent emergent evaluation forearm injury. Patient states he bumped his left forearm on a door today.  Patient states he noticed after this is small piece of what appears to be a suture coming through his scar on his left on.  Physical exam consistent with small piece of suture protruding through the scar on his left proximal ulnar surface of his forearm.  There is no drainage.  There is no swelling or erythema or signs of infection.  Left upper extremity with no signs of neurovascular compromise.  He has normal flexion and extension of his left wrist.  He has normal sensation of his left upper extremity with the exception of some paresthesias over scar site which is chronic.  X-ray without acute findings.  I do not think any acute fracture.  Reviewed by Dr. Silva. Patient is given norco for pain. The suture is cut and an antibiotic ointment and dressing are applied to the site. Patient is instructed to follow up with his orthopedic on Monday. Based on my clinical evaluation, I do not appreciate any immediate, emergent, or life threatening condition or etiology that warrants additional workup today and feel that the patient can  be discharged with close follow up care. Case discussed with Dr. Silva who is agreeable to plan of care. Follow up and return precautions discussed; patient verbalized understanding and is agreeable to plan of care. Patient discharged home in stable condition.               Scribe Attestation:   Scribe #1: I performed the above scribed service and the documentation accurately describes the services I performed. I attest to the accuracy of the note.    Attending Attestation:     Physician Attestation Statement for NP/PA:   I have conducted a face to face encounter with this patient in addition to the NP/PA, due to Medical Complexity    Other NP/PA Attestation Additions:      Medical Decision Making: I provided a face to face evaluation of this patient.  I discussed the patient's care with Advanced Practice Clinician.  I reviewed their note and agree with the history, physical, assessment, diagnosis, treatment, and discharge plan provided by the Advanced Practice Clinician. My overall impression is arm pain.  The patient has been instructed to follow up with their physician or the one provided as well as specific return precautions.          Physician Attestation for Scribe:  Physician Attestation Statement for Scribe #1: I, aGby Diez, reviewed documentation, as scribed by in my presence, and it is both accurate and complete.     Comments: I, Gaby Diez NP-C, personally performed the services described in this documentation. All medical record entries made by the scribe were at my direction and in my presence.  I have reviewed the chart and agree that the record reflects my personal performance and is accurate and complete. DESTINEE Feldman.  9:43 PM 10/06/2018             ED Course as of Oct 06 2144   Sat Oct 06, 2018   2003 BP: 104/70 [EF]   2003 Temp: 98.6 °F (37 °C) [EF]   2003 Temp src: Oral [EF]   2003 Pulse: 63 [EF]   2003 Resp: 16 [EF]   2003 SpO2: 97 % [EF]   2036 Patient presents with a suture  protruding from his surgical site on the proximal left forearm on the ulnar side.  Suture is fixed in place but can be pulled out a small amount.  This will be cut back a little bit.  Call orthopedics Dr. caballero on Monday.  Left wrist extension and flexion is normal.  Left wrist radial ulnar median nerve muscle function also appears normal on exam.  These were all tested individually.  Paresthesia near the site of the injury.  [EF]      ED Course User Index  [EF] Gustavo Silva MD     Clinical Impression:   The primary encounter diagnosis was Contusion of left forearm, initial encounter. A diagnosis of Pain was also pertinent to this visit.      Disposition:   Disposition: Discharged  Condition: Stable                        Gaby Diez NP  10/06/18 [id number]       Gustavo Silva MD  10/07/18 3468

## 2018-10-07 NOTE — ED NOTES
Patient here after bumping arm; had surgery few months ago and bomped it today and healed suture line popped open and suture is sticking out.

## 2019-07-18 ENCOUNTER — HOSPITAL ENCOUNTER (EMERGENCY)
Facility: HOSPITAL | Age: 39
Discharge: HOME | End: 2019-07-18
Attending: EMERGENCY MEDICINE

## 2019-07-18 VITALS
TEMPERATURE: 98 F | WEIGHT: 160 LBS | OXYGEN SATURATION: 99 % | HEART RATE: 66 BPM | SYSTOLIC BLOOD PRESSURE: 122 MMHG | BODY MASS INDEX: 23.7 KG/M2 | DIASTOLIC BLOOD PRESSURE: 81 MMHG | RESPIRATION RATE: 12 BRPM | HEIGHT: 69 IN

## 2019-07-18 DIAGNOSIS — R55 NEAR SYNCOPE: ICD-10-CM

## 2019-07-18 DIAGNOSIS — G44.209 TENSION HEADACHE: Primary | ICD-10-CM

## 2019-07-18 DIAGNOSIS — R06.02 SOB (SHORTNESS OF BREATH): ICD-10-CM

## 2019-07-18 DIAGNOSIS — S39.012A STRAIN OF LUMBAR REGION, INITIAL ENCOUNTER: ICD-10-CM

## 2019-07-18 LAB
ALBUMIN SERPL BCP-MCNC: 3.9 G/DL (ref 3.5–5.2)
ALP SERPL-CCNC: 112 U/L (ref 55–135)
ALT SERPL W/O P-5'-P-CCNC: 15 U/L (ref 10–44)
ANION GAP SERPL CALC-SCNC: 7 MMOL/L (ref 8–16)
AST SERPL-CCNC: 23 U/L (ref 10–40)
BASOPHILS # BLD AUTO: 0.03 K/UL (ref 0–0.2)
BASOPHILS NFR BLD: 0.3 % (ref 0–1.9)
BILIRUB SERPL-MCNC: 0.5 MG/DL (ref 0.1–1)
BUN SERPL-MCNC: 20 MG/DL (ref 6–20)
CALCIUM SERPL-MCNC: 9.4 MG/DL (ref 8.7–10.5)
CHLORIDE SERPL-SCNC: 99 MMOL/L (ref 95–110)
CO2 SERPL-SCNC: 32 MMOL/L (ref 23–29)
CREAT SERPL-MCNC: 1.2 MG/DL (ref 0.5–1.4)
DIFFERENTIAL METHOD: NORMAL
EOSINOPHIL # BLD AUTO: 0 K/UL (ref 0–0.5)
EOSINOPHIL NFR BLD: 0 % (ref 0–8)
ERYTHROCYTE [DISTWIDTH] IN BLOOD BY AUTOMATED COUNT: 12.3 % (ref 11.5–14.5)
EST. GFR  (AFRICAN AMERICAN): >60 ML/MIN/1.73 M^2
EST. GFR  (NON AFRICAN AMERICAN): >60 ML/MIN/1.73 M^2
GLUCOSE SERPL-MCNC: 85 MG/DL (ref 70–110)
HCT VFR BLD AUTO: 47.6 % (ref 40–54)
HGB BLD-MCNC: 16.5 G/DL (ref 14–18)
IMM GRANULOCYTES # BLD AUTO: 0.02 K/UL (ref 0–0.04)
LYMPHOCYTES # BLD AUTO: 2.9 K/UL (ref 1–4.8)
LYMPHOCYTES NFR BLD: 29.5 % (ref 18–48)
MCH RBC QN AUTO: 30.4 PG (ref 27–31)
MCHC RBC AUTO-ENTMCNC: 34.7 G/DL (ref 32–36)
MCV RBC AUTO: 88 FL (ref 82–98)
MONOCYTES # BLD AUTO: 0.5 K/UL (ref 0.3–1)
MONOCYTES NFR BLD: 4.8 % (ref 4–15)
NEUTROPHILS # BLD AUTO: 6.3 K/UL (ref 1.8–7.7)
NEUTROPHILS NFR BLD: 65.2 % (ref 38–73)
NRBC BLD-RTO: 0 /100 WBC
PLATELET # BLD AUTO: 221 K/UL (ref 150–350)
PMV BLD AUTO: 10 FL (ref 9.2–12.9)
POTASSIUM SERPL-SCNC: 3.9 MMOL/L (ref 3.5–5.1)
PROT SERPL-MCNC: 6.9 G/DL (ref 6–8.4)
RBC # BLD AUTO: 5.42 M/UL (ref 4.6–6.2)
SODIUM SERPL-SCNC: 138 MMOL/L (ref 136–145)
TSH SERPL DL<=0.005 MIU/L-ACNC: 0.43 UIU/ML (ref 0.4–4)
WBC # BLD AUTO: 9.72 K/UL (ref 3.9–12.7)

## 2019-07-18 PROCEDURE — 36415 COLL VENOUS BLD VENIPUNCTURE: CPT

## 2019-07-18 PROCEDURE — 80053 COMPREHEN METABOLIC PANEL: CPT

## 2019-07-18 PROCEDURE — 84443 ASSAY THYROID STIM HORMONE: CPT

## 2019-07-18 PROCEDURE — 96372 THER/PROPH/DIAG INJ SC/IM: CPT | Mod: 59

## 2019-07-18 PROCEDURE — 25000003 PHARM REV CODE 250: Performed by: NURSE PRACTITIONER

## 2019-07-18 PROCEDURE — 63600175 PHARM REV CODE 636 W HCPCS: Performed by: NURSE PRACTITIONER

## 2019-07-18 PROCEDURE — 99285 EMERGENCY DEPT VISIT HI MDM: CPT | Mod: 25

## 2019-07-18 PROCEDURE — 96360 HYDRATION IV INFUSION INIT: CPT

## 2019-07-18 PROCEDURE — 85025 COMPLETE CBC W/AUTO DIFF WBC: CPT

## 2019-07-18 PROCEDURE — 93005 ELECTROCARDIOGRAM TRACING: CPT

## 2019-07-18 RX ORDER — KETOROLAC TROMETHAMINE 30 MG/ML
15 INJECTION, SOLUTION INTRAMUSCULAR; INTRAVENOUS
Status: COMPLETED | OUTPATIENT
Start: 2019-07-18 | End: 2019-07-18

## 2019-07-18 RX ORDER — MECLIZINE HYDROCHLORIDE 25 MG/1
25 TABLET ORAL 3 TIMES DAILY PRN
Qty: 20 TABLET | Refills: 0 | Status: SHIPPED | OUTPATIENT
Start: 2019-07-18 | End: 2020-08-11 | Stop reason: CLARIF

## 2019-07-18 RX ORDER — DIAZEPAM 5 MG/1
5 TABLET ORAL
Status: COMPLETED | OUTPATIENT
Start: 2019-07-18 | End: 2019-07-18

## 2019-07-18 RX ORDER — NAPROXEN 500 MG/1
500 TABLET ORAL 2 TIMES DAILY WITH MEALS
Qty: 60 TABLET | Refills: 0 | Status: SHIPPED | OUTPATIENT
Start: 2019-07-18 | End: 2020-08-11 | Stop reason: CLARIF

## 2019-07-18 RX ADMIN — SODIUM CHLORIDE 1000 ML: 0.9 INJECTION, SOLUTION INTRAVENOUS at 01:07

## 2019-07-18 RX ADMIN — KETOROLAC TROMETHAMINE 15 MG: 30 INJECTION, SOLUTION INTRAMUSCULAR at 03:07

## 2019-07-18 RX ADMIN — DIAZEPAM 5 MG: 5 TABLET ORAL at 03:07

## 2019-07-18 NOTE — ED PROVIDER NOTES
"Encounter Date: 7/18/2019    SCRIBE #1 NOTE: I, Vijaya Colon, am scribing for, and in the presence of, DESTINEE Feldman.       History     Chief Complaint   Patient presents with    Back Pain    Abscess     anterior chest    Headache       Time seen by provider: 1:35 PM on 07/18/2019    Anshul Mayes is a 39 y.o. male with HTN, who presents to the ED with multiple complaints, including back pain, headaches and a mass on his chest. He presents with worsening lower back pain starting 3 days ago. Patient reports to have previously broken his back and re-exacerbated his injury while bending over during a physical examination 3 days ago. Pain worsens with movements. The patient reports having chronic daily headaches and states he has a headache currently that is unchanged from previous headaches. He also mentioned having a "lump" on his right upper chest that has been present for a few months. He says that the "lump" has recently gotten bigger and he was concerned about it. The patient also complains of dizziness, generalized weakness, fatigue, recent night sweats, and shortness of breath. Denies cough. He states SOB started this morning and "comes and goes". He endorses weakness and dizziness to be persistent for the past few months but denies visiting a PCP. He denies recent fevers. The patient is a current smoker and smokes 1ppd. Denies history of blood clots and denies being hormonal therapy. Denies history of IV drug use and cancer. Patient allergic to Penicillin and Fentanyl. PSHx includes left knee surgery and eye surgery.     The history is provided by the patient.     Review of patient's allergies indicates:   Allergen Reactions    Penicillins Anaphylaxis    Fentanyl Nausea Only     Past Medical History:   Diagnosis Date    Hypertension      Past Surgical History:   Procedure Laterality Date    EYE SURGERY      KNEE SURGERY Left      History reviewed. No pertinent family history.  Social " History     Tobacco Use    Smoking status: Current Every Day Smoker     Packs/day: 1.00     Years: 30.00     Pack years: 30.00     Types: Cigarettes    Smokeless tobacco: Never Used   Substance Use Topics    Alcohol use: No    Drug use: No     Review of Systems   Constitutional: Positive for diaphoresis (night sweats) and fatigue. Negative for activity change, chills and fever.   HENT: Negative for facial swelling.    Eyes: Negative for visual disturbance.   Respiratory: Positive for shortness of breath (intermittent). Negative for cough.    Cardiovascular: Negative for chest pain and palpitations.   Gastrointestinal: Negative for abdominal distention, abdominal pain, nausea and vomiting.   Musculoskeletal: Positive for back pain. Negative for gait problem and neck pain.        + mass; right chest wall   Skin: Negative for pallor and rash.   Neurological: Positive for dizziness, weakness (generalized) and headaches (chronic). Negative for syncope, facial asymmetry and numbness.   Hematological: Does not bruise/bleed easily.   Psychiatric/Behavioral: Negative for agitation.       Physical Exam     Initial Vitals [07/18/19 1320]   BP Pulse Resp Temp SpO2   114/74 87 12 98.3 °F (36.8 °C) 97 %      MAP       --         Physical Exam    Nursing note and vitals reviewed.  Constitutional: Vital signs are normal. He appears well-developed and well-nourished. He is not diaphoretic. No distress.   HENT:   Head: Normocephalic and atraumatic.   Eyes: Pupils are equal, round, and reactive to light. Right eye exhibits abnormal extraocular motion. Right eye exhibits no nystagmus. Left eye exhibits abnormal extraocular motion. Left eye exhibits no nystagmus.   PERRL. Abnormal EOM in both eyes. Tracks with one eye at a time. (Patient states this is a chronic problem since he was a child and has multiple surgeries on his eyes) No nystagmus.   Neck: Normal range of motion. Neck supple.   Cardiovascular: Normal rate, regular  rhythm, normal heart sounds and intact distal pulses. Exam reveals no gallop and no friction rub.    No murmur heard.  Pulmonary/Chest: Breath sounds normal. He has no wheezes. He has no rhonchi. He has no rales. He exhibits mass.   Equal, bilateral breath sounds noted without wheezing. Right subclavicular palpable chest wall mass approximately 2 cm x2 cm that is firm without fluctuance or induration.    Abdominal: Normal appearance. There is no tenderness.   Musculoskeletal:        Cervical back: He exhibits no tenderness.        Thoracic back: He exhibits no tenderness.        Lumbar back: He exhibits tenderness.   Bilateral paraspinal lumbar tenderness. No midline C-spine, T-spine or L-spine tenderness.   Neurological: He is alert and oriented to person, place, and time. He has normal strength. No cranial nerve deficit or sensory deficit. Gait normal.   No focal neurological deficits noted.  Cranial nerves III-XII grossly intact.  Equal, rapid alternating movements noted to bilateral upper and lower extremities. Gait normal. 5/5 strength and sensation to light touch to BLE and BUE.   Skin: Skin is warm and intact.   Psychiatric: He has a normal mood and affect. His speech is normal and behavior is normal.         ED Course   Procedures  Labs Reviewed   COMPREHENSIVE METABOLIC PANEL - Abnormal; Notable for the following components:       Result Value    CO2 32 (*)     Anion Gap 7 (*)     All other components within normal limits   CBC W/ AUTO DIFFERENTIAL   TSH        ECG Results          EKG 12-lead (In process)  Result time 07/18/19 16:13:55    In process by Interface, Lab In [facility] (07/18/19 16:13:55)                 Narrative:    Test Reason : R55,    Vent. Rate : 064 BPM     Atrial Rate : 064 BPM     P-R Int : 150 ms          QRS Dur : 110 ms      QT Int : 428 ms       P-R-T Axes : 085 077 057 degrees     QTc Int : 441 ms    Normal sinus rhythm  Incomplete right bundle branch block  Borderline Abnormal  ECG  When compared with ECG of 05-APR-2018 19:20,  Incomplete right bundle branch block is now Present  Borderline criteria for Inferior infarct are no longer Present    Referred By: [email or web address]   SELF           Confirmed By:                             Imaging Results          X-Ray Chest PA And Lateral (Final result)  Result time 07/18/19 15:00:38    Final result by Patty Aguilera MD (07/18/19 15:00:38)                 Impression:      No acute cardiopulmonary disease.      Electronically signed by: Patty Aguilera MD  Date:    07/18/2019  Time:    15:00             Narrative:    EXAMINATION:  XR CHEST PA AND LATERAL    CLINICAL HISTORY:  Shortness of breath    TECHNIQUE:  PA and lateral views of the chest were performed.    COMPARISON:  4/5/2018    FINDINGS:  The heart is not enlarged.  The lungs are expanded and clear.  The costophrenic sulci are sharp.                                 Medical Decision Making:   History:   Old Medical Records: I decided to obtain old medical records.  Differential Diagnosis:   Lumbar strain  Abscess  lipoma  Anemia  Tension headache   Clinical Tests:   Lab Tests: Ordered and Reviewed  Radiological Study: Ordered and Reviewed  Medical Tests: Ordered and Reviewed       APC / Resident Notes:   Patient is a 39 y.o. male who presents to the ED 07/18/2019 who underwent emergent evaluation for multiple planes that apparently have been present for months.  Patient complains of lower back pain which he states is chronic in nature since the surgery but recently started hurting worse.  He has no loss of bowel or bladder control.  He has 5/5 strength and normal sensation bilateral lower extremities and is ambulatory in the emergency department.  I do not think cauda equina.  I do not think epidural abscess.  He denies any major trauma. I do not think acute fracture.  He is given Toradol with relief of his back pain as well as his headache.  Patient suffers from chronic headaches.  He does  have a abnormal extraocular movements which is his baseline.  Pupils otherwise equal reactive to light accommodation and patient has no other focal neurological deficits on exam.  His headache is consistent with previous headaches.  He does have associated dizziness, fatigue, and generalized weakness. Do not think any acute intracranial process given neuro exam and headache is consistent with previous headaches.  I do not think emergent CT scan or other imaging is indicated at this time.  Neck is supple. Vital signs normal.  Labs are unremarkable. I do not think acute infectious process such as meningitis.  Patient does not appear acutely anemic.  Electrolytes normal. He does not appear acutely dehydrated.  EKG without acute ST or T-wave abnormalities.  Patient denies any chest pain. I do not think ACS or PE.  Patient is PERC negative. He is a chronic smoker with chronic shortness of breath and cough.  He is educated on smoking cessation.  Chest x-ray without acute findings.  I do not think acute process such as pneumonia or pneumothorax.  TSH normal. I do not think thyroid crisis.  Patient's symptoms have been present and stable for months.  I believe it is reasonable that he follows up with his PCP.  He is given IV fluids, muscle relaxer's, and anti-inflammatories in the emergency department with significant relief of most of his symptoms. Based on my clinical evaluation, I do not appreciate any immediate, emergent, or life threatening condition or etiology that warrants additional workup today and feel that the patient can be discharged with close follow up care. Case discussed with Dr. Silva who is agreeable to plan of care. Follow up and return precautions discussed; patient verbalized understanding and is agreeable to plan of care. Patient discharged home in stable condition.               Scribe Attestation:   Scribe #1: I performed the above scribed service and the documentation accurately describes the  services I performed. I attest to the accuracy of the note.    Attending Attestation:     Physician Attestation Statement for NP/PA:   I discussed this assessment and plan of this patient with the NP/PA, but I did not personally examine the patient. The face to face encounter was performed by the NP/PA.        Physician Attestation for Scribe:  Physician Attestation Statement for Scribe #1: I, Gaby Diez, reviewed documentation, as scribed by in my presence, and it is both accurate and complete.     Comments: I, ESTRELLA FeldmanC, personally performed the services described in this documentation. All medical record entries made by the scribe were at my direction and in my presence.  I have reviewed the chart and agree that the record reflects my personal performance and is accurate and complete. DESTINEE Feldman.  5:08 PM 07/18/2019               ED Course as of Jul 18 1634   Thu Jul 18, 2019   1410 BP: 114/74 [EF]   1410 Temp: 98.3 °F (36.8 °C) [EF]   1410 Temp src: Oral [EF]   1410 Pulse: 87 [EF]   1410 Resp: 12 [EF]   1410 SpO2: 97 % [EF]   1410 SpO2: 98 % [EF]   1410 Pulse: 79 [EF]   1410 BP(!): 135/90 [EF]   1410 WBC: 9.72 [EF]   1410 Hemoglobin: 16.5 [EF]   1410 Platelets: 221 [EF]   1418 Sinus rhythm incomplete right bundle branch block 64 beats per minute no ST segment elevation or depression or T-wave inversion.  Independently interpreted.    [EF]      ED Course User Index  [EF] Gustavo Silva MD     Clinical Impression:       ICD-10-CM ICD-9-CM   1. Tension headache G44.209 307.81   2. Near syncope R55 780.2   3. SOB (shortness of breath) R06.02 786.05   4. Strain of lumbar region, initial encounter S39.012A 847.2         Disposition:   Disposition: Discharged  Condition: Stable                        Gaby Diez NP  07/18/19 1716       Gustavo Silva MD  07/18/19 1900

## 2020-08-11 ENCOUNTER — HOSPITAL ENCOUNTER (EMERGENCY)
Facility: HOSPITAL | Age: 40
Discharge: HOME | End: 2020-08-11
Attending: EMERGENCY MEDICINE
Payer: MEDICAID

## 2020-08-11 VITALS
WEIGHT: 160 LBS | DIASTOLIC BLOOD PRESSURE: 77 MMHG | OXYGEN SATURATION: 98 % | HEART RATE: 74 BPM | BODY MASS INDEX: 23.7 KG/M2 | TEMPERATURE: 98 F | SYSTOLIC BLOOD PRESSURE: 117 MMHG | HEIGHT: 69 IN | RESPIRATION RATE: 16 BRPM

## 2020-08-11 DIAGNOSIS — N50.812 TESTICULAR PAIN, LEFT: ICD-10-CM

## 2020-08-11 DIAGNOSIS — N50.89 TESTICULAR MASS: Primary | ICD-10-CM

## 2020-08-11 LAB
BILIRUB UR QL STRIP: NEGATIVE
CLARITY UR: CLEAR
COLOR UR: COLORLESS
GLUCOSE UR QL STRIP: NEGATIVE
HGB UR QL STRIP: NEGATIVE
KETONES UR QL STRIP: NEGATIVE
LEUKOCYTE ESTERASE UR QL STRIP: NEGATIVE
NITRITE UR QL STRIP: NEGATIVE
PH UR STRIP: 6 [PH] (ref 5–8)
PROT UR QL STRIP: NEGATIVE
SP GR UR STRIP: 1 (ref 1–1.03)
URN SPEC COLLECT METH UR: ABNORMAL
UROBILINOGEN UR STRIP-ACNC: NEGATIVE EU/DL

## 2020-08-11 PROCEDURE — 99284 EMERGENCY DEPT VISIT MOD MDM: CPT | Mod: 25

## 2020-08-11 PROCEDURE — 81003 URINALYSIS AUTO W/O SCOPE: CPT

## 2020-08-11 PROCEDURE — 96372 THER/PROPH/DIAG INJ SC/IM: CPT | Mod: 59

## 2020-08-11 PROCEDURE — 63600175 PHARM REV CODE 636 W HCPCS: Performed by: EMERGENCY MEDICINE

## 2020-08-11 RX ORDER — IBUPROFEN 800 MG/1
800 TABLET ORAL EVERY 6 HOURS PRN
COMMUNITY

## 2020-08-11 RX ORDER — SULFAMETHOXAZOLE AND TRIMETHOPRIM 800; 160 MG/1; MG/1
1 TABLET ORAL 2 TIMES DAILY
COMMUNITY
Start: 2020-08-03

## 2020-08-11 RX ORDER — KETOROLAC TROMETHAMINE 30 MG/ML
30 INJECTION, SOLUTION INTRAMUSCULAR; INTRAVENOUS
Status: COMPLETED | OUTPATIENT
Start: 2020-08-11 | End: 2020-08-11

## 2020-08-11 RX ORDER — IBUPROFEN 600 MG/1
600 TABLET ORAL EVERY 6 HOURS PRN
Qty: 20 TABLET | Refills: 0 | Status: SHIPPED | OUTPATIENT
Start: 2020-08-11

## 2020-08-11 RX ORDER — DOXYCYCLINE 100 MG/1
100 CAPSULE ORAL 2 TIMES DAILY
Qty: 20 CAPSULE | Refills: 0 | Status: SHIPPED | OUTPATIENT
Start: 2020-08-11 | End: 2020-08-21

## 2020-08-11 RX ADMIN — KETOROLAC TROMETHAMINE 30 MG: 30 INJECTION, SOLUTION INTRAMUSCULAR; INTRAVENOUS at 10:08

## 2020-08-11 NOTE — ED PROVIDER NOTES
Encounter Date: 8/11/2020       History     Chief Complaint   Patient presents with    TESTICLE PROBLEM     FEELS LUMP IN L TESTICLE X SEVERAL MOS     40-year-old male with past medical history of hypertension presents with testicular pain x1 week.  Patient states the pain has been progressively getting worse decided to come for assessment.  He denies any difficulty urinating, trauma, discharge, lesions, rashes.  Patient is otherwise stable and has no other complaints.        Review of patient's allergies indicates:   Allergen Reactions    Penicillins Anaphylaxis    Fentanyl Nausea Only     Past Medical History:   Diagnosis Date    Hypertension      Past Surgical History:   Procedure Laterality Date    EYE SURGERY      KNEE SURGERY Left      No family history on file.  Social History     Tobacco Use    Smoking status: Current Every Day Smoker     Packs/day: 1.00     Years: 30.00     Pack years: 30.00     Types: Cigarettes    Smokeless tobacco: Never Used   Substance Use Topics    Alcohol use: No    Drug use: No     Review of Systems   Constitutional: Negative for fever.   HENT: Negative for sore throat.    Respiratory: Negative for shortness of breath.    Cardiovascular: Negative for chest pain.   Gastrointestinal: Negative for nausea.   Genitourinary: Positive for testicular pain. Negative for dysuria.   Musculoskeletal: Negative for back pain.   Skin: Negative for rash.   Neurological: Negative for weakness.   Hematological: Does not bruise/bleed easily.   All other systems reviewed and are negative.      Physical Exam     Initial Vitals [08/11/20 0848]   BP Pulse Resp Temp SpO2   127/79 74 16 98.8 °F (37.1 °C) 98 %      MAP       --         Physical Exam    Nursing note and vitals reviewed.  Constitutional: He appears well-developed and well-nourished. He is not diaphoretic. He appears distressed.   HENT:   Head: Normocephalic and atraumatic.   Mouth/Throat: Oropharynx is clear and moist.   Eyes:  Conjunctivae and EOM are normal. Pupils are equal, round, and reactive to light.   Neck: Normal range of motion. Neck supple. No thyromegaly present. No JVD present.   Cardiovascular: Normal rate, regular rhythm and normal heart sounds. Exam reveals no gallop and no friction rub.    No murmur heard.  Pulmonary/Chest: Breath sounds normal. No respiratory distress. He has no wheezes. He exhibits no tenderness.   Abdominal: Soft. Bowel sounds are normal. He exhibits no distension. There is no abdominal tenderness. There is no rebound and no guarding.   Genitourinary: Left testis shows mass and tenderness.   Musculoskeletal: Normal range of motion. No tenderness or edema.   Neurological: He is alert and oriented to person, place, and time. He has normal strength. No cranial nerve deficit or sensory deficit.   Skin: Skin is warm and dry. Capillary refill takes less than 2 seconds. No rash noted. No erythema.   Psychiatric: He has a normal mood and affect.         ED Course   Procedures  Labs Reviewed   URINALYSIS, REFLEX TO URINE CULTURE - Abnormal; Notable for the following components:       Result Value    Color, UA Colorless (*)     All other components within normal limits    Narrative:     Specimen Source->Urine          Imaging Results          US Scrotum And Testicles (Final result)  Result time 08/11/20 10:31:26    Final result by Phillip Clayton MD (08/11/20 10:31:26)                 Narrative:    Reason: Several months of scrotal pain/lump with recent worsening    COMPARISON:None    FINDINGS:  Right testicle measures 24 x 29 x 46 mm, and left testicle measures 26  x 28 x 43 mm. Bilateral testicles maintain homogeneous echogenicity  without intratesticular mass. Bilateral testicles contain normal  vascular flow on color and spectral Doppler imaging.    Tiny left hydrocele is present. Right epididymis is normal. Left  epididymis appears unremarkable, although at site of palpable mass is  small echogenic 5-6 mm  focus which may correlate with such, lying  adjacent to the epididymal tail and a entirely nonspecific.    IMPRESSION:    1. Small nonspecific 5-6 mm echogenic mass adjacent to left epididymal  tail occurs at site of reported palpable abnormality and is of  entirely nonspecific etiology on the basis of ultrasound  characteristics. Clinical follow-up is recommended. Additional need  for urologic or surgical evaluation should be on the basis of clinical  assessment.  2. Tiny left hydrocele.    Electronically Signed by Phillip Clayton M.D. on 8/11/2020 10:39 AM                               Medical Decision Making:   Initial Assessment:   40-year-old male initial assessment in mild distress secondary to testicular pain.  Patient is alert and oriented x3, neurologically and neurovascular intact no focal deficits.  He is nontoxic appearing and his vital stable at this time.  Differential Diagnosis:   Testicular torsion, epididymitis, testicular mass, UTI, STI  Clinical Tests:   Radiological Study: Ordered and Reviewed  ED Management:  Patient has been reassessed noted to have no acute changes condition.  UA negative for any infectious etiology and ultrasound shows a mass but nondiagnostic.  Patient will follow-up with urology and he was also placed on doxycycline for any infectious etiology such as epididymitis based on hydrocele noted on ultrasound.  Patient will continue to take ibuprofen for his pain and follow up in 1-2 days.  Patient has remained stable while in the ED and discharged home in stable condition with follow-up as discussed.  Mr. Mayes is aware of the plan and in agreement with discharge.                                 Clinical Impression:       ICD-10-CM ICD-9-CM   1. Testicular mass  N50.89 608.89   2. Testicular pain, left  N50.812 608.9             ED Disposition Condition    Discharge Stable        ED Prescriptions     Medication Sig Dispense Start Date End Date Auth. Provider    ibuprofen  (ADVIL,MOTRIN) 600 MG tablet Take 1 tablet (600 mg total) by mouth every 6 (six) hours as needed for Pain. 20 tablet 8/11/2020  Eliecer Perez MD    doxycycline (VIBRAMYCIN) 100 MG Cap Take 1 capsule (100 mg total) by mouth 2 (two) times daily. for 10 days 20 capsule 8/11/2020 8/21/2020 Eliecer Perez MD        Follow-up Information     Follow up With Specialties Details Why Contact Info Additional Information    [facility] Emergency Medicine  As needed, If symptoms worsen [address] [facility] [id number] [phone] 1st floor    [facility]  Schedule an appointment as soon as possible for a visit in 1 week  [address] [facility] [address]  [phone]       Carolyne Cazares Jr., MD Urology Schedule an appointment as soon as possible for a visit in 2 days For wound re-check [address] [place] [address] DR  [address]  [facility] [address]  [phone]                                        Eliecer Perez MD  08/11/20 1111       Eliecer Perez MD  08/11/20 1121

## 2020-11-04 DIAGNOSIS — M54.2 CERVICALGIA: Primary | ICD-10-CM

## 2020-11-05 ENCOUNTER — HOSPITAL ENCOUNTER (OUTPATIENT)
Dept: RADIOLOGY | Facility: HOSPITAL | Age: 40
Discharge: HOME | End: 2020-11-05
Attending: NURSE PRACTITIONER
Payer: MEDICAID

## 2020-11-05 DIAGNOSIS — M54.2 CERVICALGIA: ICD-10-CM

## 2020-11-05 PROCEDURE — 72050 X-RAY EXAM NECK SPINE 4/5VWS: CPT | Mod: TC,PO

## 2020-11-10 PROBLEM — M54.2 CERVICALGIA: Status: ACTIVE | Noted: 2020-11-10

## 2020-11-11 ENCOUNTER — CLINICAL SUPPORT (OUTPATIENT)
Dept: REHABILITATION | Facility: HOSPITAL | Age: 40
End: 2020-11-11
Payer: MEDICAID

## 2020-11-11 DIAGNOSIS — R29.3 POSTURE ABNORMALITY: ICD-10-CM

## 2020-11-11 DIAGNOSIS — M54.2 CERVICALGIA: ICD-10-CM

## 2020-11-11 PROCEDURE — 97161 PT EVAL LOW COMPLEX 20 MIN: CPT | Performed by: PHYSICAL THERAPIST

## 2020-11-11 PROCEDURE — 97110 THERAPEUTIC EXERCISES: CPT | Performed by: PHYSICAL THERAPIST
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
## 2021-03-25 ENCOUNTER — HOSPITAL ENCOUNTER (EMERGENCY)
Facility: HOSPITAL | Age: 41
Discharge: HOME | End: 2021-03-25
Attending: EMERGENCY MEDICINE
Payer: MEDICAID

## 2021-03-25 VITALS
SYSTOLIC BLOOD PRESSURE: 123 MMHG | HEIGHT: 69 IN | WEIGHT: 160 LBS | RESPIRATION RATE: 18 BRPM | OXYGEN SATURATION: 98 % | DIASTOLIC BLOOD PRESSURE: 83 MMHG | HEART RATE: 54 BPM | TEMPERATURE: 98 F | BODY MASS INDEX: 23.7 KG/M2

## 2021-03-25 DIAGNOSIS — M54.12 CERVICAL RADICULOPATHY: Primary | ICD-10-CM

## 2021-03-25 DIAGNOSIS — M54.9 BACK PAIN: ICD-10-CM

## 2021-03-25 PROCEDURE — 99284 EMERGENCY DEPT VISIT MOD MDM: CPT | Mod: 25

## 2021-03-25 PROCEDURE — 96372 THER/PROPH/DIAG INJ SC/IM: CPT

## 2021-03-25 PROCEDURE — 93010 ELECTROCARDIOGRAM REPORT: CPT | Mod: ,,, | Performed by: SPECIALIST

## 2021-03-25 PROCEDURE — 93005 ELECTROCARDIOGRAM TRACING: CPT | Performed by: SPECIALIST

## 2021-03-25 PROCEDURE — 63600175 PHARM REV CODE 636 W HCPCS: Performed by: EMERGENCY MEDICINE

## 2021-03-25 PROCEDURE — 93010 EKG 12-LEAD: ICD-10-PCS | Mod: ,,, | Performed by: SPECIALIST

## 2021-03-25 RX ORDER — METHOCARBAMOL 750 MG/1
750 TABLET, FILM COATED ORAL 3 TIMES DAILY
Qty: 21 TABLET | Refills: 0 | Status: SHIPPED | OUTPATIENT
Start: 2021-03-25 | End: 2021-04-01

## 2021-03-25 RX ORDER — KETOROLAC TROMETHAMINE 30 MG/ML
30 INJECTION, SOLUTION INTRAMUSCULAR; INTRAVENOUS
Status: COMPLETED | OUTPATIENT
Start: 2021-03-25 | End: 2021-03-25

## 2021-03-25 RX ORDER — ORPHENADRINE CITRATE 30 MG/ML
60 INJECTION INTRAMUSCULAR; INTRAVENOUS
Status: COMPLETED | OUTPATIENT
Start: 2021-03-25 | End: 2021-03-25

## 2021-03-25 RX ORDER — METHYLPREDNISOLONE ACETATE 40 MG/ML
40 INJECTION, SUSPENSION INTRA-ARTICULAR; INTRALESIONAL; INTRAMUSCULAR; SOFT TISSUE
Status: COMPLETED | OUTPATIENT
Start: 2021-03-25 | End: 2021-03-25

## 2021-03-25 RX ORDER — MELOXICAM 7.5 MG/1
7.5 TABLET ORAL DAILY
Qty: 7 TABLET | Refills: 0 | Status: SHIPPED | OUTPATIENT
Start: 2021-03-25

## 2021-03-25 RX ADMIN — ORPHENADRINE CITRATE 60 MG: 30 INJECTION INTRAMUSCULAR; INTRAVENOUS at 09:03

## 2021-03-25 RX ADMIN — METHYLPREDNISOLONE ACETATE 40 MG: 40 INJECTION, SUSPENSION INTRA-ARTICULAR; INTRALESIONAL; INTRAMUSCULAR; SOFT TISSUE at 09:03

## 2021-03-25 RX ADMIN — KETOROLAC TROMETHAMINE 30 MG: 30 INJECTION, SOLUTION INTRAMUSCULAR; INTRAVENOUS at 09:03

## 2021-04-15 DIAGNOSIS — M54.2 CERVICALGIA: Primary | ICD-10-CM

## 2021-04-15 DIAGNOSIS — M25.511 RIGHT SHOULDER PAIN: ICD-10-CM

## 2021-04-16 ENCOUNTER — HOSPITAL ENCOUNTER (OUTPATIENT)
Dept: RADIOLOGY | Facility: HOSPITAL | Age: 41
Discharge: HOME | End: 2021-04-16
Attending: PHYSICAL MEDICINE & REHABILITATION
Payer: MEDICAID

## 2021-04-16 DIAGNOSIS — M25.512 LEFT SHOULDER PAIN: ICD-10-CM

## 2021-04-16 DIAGNOSIS — M25.512 LEFT SHOULDER PAIN: Primary | ICD-10-CM

## 2021-04-16 DIAGNOSIS — M54.2 CERVICALGIA: ICD-10-CM

## 2021-04-16 PROCEDURE — 73030 X-RAY EXAM OF SHOULDER: CPT | Mod: TC,PO,LT

## 2021-04-16 PROCEDURE — 72050 X-RAY EXAM NECK SPINE 4/5VWS: CPT | Mod: TC,PO

## 2021-04-29 ENCOUNTER — PATIENT MESSAGE (OUTPATIENT)
Dept: RESEARCH | Facility: HOSPITAL | Age: 41
End: 2021-04-29

## 2022-05-22 ENCOUNTER — HOSPITAL ENCOUNTER (EMERGENCY)
Facility: HOSPITAL | Age: 42
Discharge: HOME | End: 2022-05-22
Attending: EMERGENCY MEDICINE
Payer: MEDICAID

## 2022-05-22 VITALS
HEART RATE: 80 BPM | OXYGEN SATURATION: 97 % | SYSTOLIC BLOOD PRESSURE: 138 MMHG | DIASTOLIC BLOOD PRESSURE: 96 MMHG | RESPIRATION RATE: 18 BRPM | TEMPERATURE: 99 F | BODY MASS INDEX: 26.66 KG/M2 | HEIGHT: 69 IN | WEIGHT: 180 LBS
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
## 2022-05-22 DIAGNOSIS — M54.2 NECK PAIN, ACUTE: ICD-10-CM

## 2022-05-22 DIAGNOSIS — S00.01XA ABRASION OF SCALP, INITIAL ENCOUNTER: ICD-10-CM

## 2022-05-22 DIAGNOSIS — S09.90XA INJURY OF HEAD, INITIAL ENCOUNTER: ICD-10-CM

## 2022-05-22 PROCEDURE — 25000003 PHARM REV CODE 250: Performed by: NURSE PRACTITIONER

## 2022-05-22 PROCEDURE — 99284 EMERGENCY DEPT VISIT MOD MDM: CPT | Mod: 25

## 2022-05-22 RX ORDER — METHOCARBAMOL 500 MG/1
500 TABLET, FILM COATED ORAL
Status: COMPLETED | OUTPATIENT
Start: 2022-05-22 | End: 2022-05-22

## 2022-05-22 RX ORDER — NAPROXEN 250 MG/1
500 TABLET ORAL
Status: COMPLETED | OUTPATIENT
Start: 2022-05-22 | End: 2022-05-22

## 2022-05-22 RX ORDER — LIDOCAINE 50 MG/G
1 PATCH TOPICAL DAILY
Qty: 5 PATCH | Refills: 0 | Status: SHIPPED | OUTPATIENT
Start: 2022-05-22

## 2022-05-22 RX ORDER — METHOCARBAMOL 750 MG/1
750 TABLET, FILM COATED ORAL 3 TIMES DAILY PRN
Qty: 15 TABLET | Refills: 0 | Status: SHIPPED | OUTPATIENT
Start: 2022-05-22 | End: 2022-05-27

## 2022-05-22 RX ORDER — HYDROCODONE BITARTRATE AND ACETAMINOPHEN 5; 325 MG/1; MG/1
1 TABLET ORAL
Status: COMPLETED | OUTPATIENT
Start: 2022-05-22 | End: 2022-05-22

## 2022-05-22 RX ORDER — BACITRACIN 500 [USP'U]/G
OINTMENT TOPICAL
Status: COMPLETED | OUTPATIENT
Start: 2022-05-22 | End: 2022-05-22

## 2022-05-22 RX ORDER — NAPROXEN 500 MG/1
500 TABLET ORAL 2 TIMES DAILY WITH MEALS
Qty: 10 TABLET | Refills: 0 | Status: SHIPPED | OUTPATIENT
Start: 2022-05-22 | End: 2022-05-27

## 2022-05-22 RX ORDER — HYDROCODONE BITARTRATE AND ACETAMINOPHEN 5; 325 MG/1; MG/1
1 TABLET ORAL EVERY 4 HOURS PRN
Qty: 18 TABLET | Refills: 0 | Status: SHIPPED | OUTPATIENT
Start: 2022-05-22 | End: 2022-05-25

## 2022-05-22 RX ADMIN — NAPROXEN 500 MG: 250 TABLET ORAL at 09:05

## 2022-05-22 RX ADMIN — METHOCARBAMOL 500 MG: 500 TABLET ORAL at 09:05

## 2022-05-22 RX ADMIN — BACITRACIN: 500 OINTMENT TOPICAL at 09:05

## 2022-05-22 RX ADMIN — HYDROCODONE BITARTRATE AND ACETAMINOPHEN 1 TABLET: 5; 325 TABLET ORAL at 09:05

## 2022-05-22 NOTE — ED PROVIDER NOTES
"Source of History:  Patient, spouse    Chief complaint:  Fall (Tripped over tool box falling into brick wall.  + headache, neck pain, lac to head.  Unknown LOC)      HPI:  Anshul Mayes is a 42 y.o. male presenting with scalp abrasion.  Patient states he tripped over a tool box this morning and hit his head on a brick wall.  Patient unsure whether he had a loss of consciousness.  Patient endorsing headache and neck pain.  Patient states he has multiple herniated disc and pinched nerves in his neck but pain is worse.  Tetanus up-to-date.    This is the extent to the patients complaints today here in the emergency department.    ROS: As per HPI and below:    Constitutional: No fever.  No chills.  Eyes: No visual changes.  ENT: No sore throat. No ear pain    Cardiovascular: No chest pain.  Respiratory: No shortness of breath.  GI: No abdominal pain.  No nausea or vomiting.  Genitourinary: No abnormal urination.  Neurologic:  Positive for headache. No focal weakness.  No numbness.  MSK: no back pain.  Positive for neck pain  Integument: No rashes or lesions.  Positive for scalp abrasion  Hematologic: No easy bruising.  Endocrine: No excessive thirst or urination.    Review of patient's allergies indicates:   Allergen Reactions    Penicillins Anaphylaxis    Fentanyl Nausea Only       PMH:  As per HPI and below:  Past Medical History:   Diagnosis Date    Hypertension      Past Surgical History:   Procedure Laterality Date    EYE SURGERY      KNEE SURGERY Left        Social History     Tobacco Use    Smoking status: Current Every Day Smoker     Packs/day: 1.00     Years: 30.00     Pack years: 30.00     Types: Cigarettes    Smokeless tobacco: Never Used   Substance Use Topics    Alcohol use: No    Drug use: No       Physical Exam:    BP (!) 147/100   Pulse 81   Temp 98.9 °F (37.2 °C) (Oral)   Resp 18   Ht 5' 9" (1.753 m)   Wt 81.6 kg (180 lb)   SpO2 97%   BMI 26.58 kg/m²     Nursing note and vital signs " reviewed.    Constitutional: No acute distress.  Nontoxic  Eyes: No conjunctival injection.Extraocular muscles are intact.  Pupils equal round reactive 3-2 mm  ENT: Oropharynx clear.  Normal phonation.   Cardiovascular: Regular rate and rhythm.  No murmurs. No gallops. No rubs  Respiratory: Clear to auscultation bilaterally.  Good air movement.  No wheezes.  No rhonchi. No rales. No accessory muscle use.  Abdomen: Soft.  Not distended.  Nontender.  No guarding.  No rebound. Non-peritoneal.  Musculoskeletal: Good range of motion all joints.  No deformities.  Neck supple.  No meningismus.  No C or T-spine tenderness to palpation.  Bilateral trapezius tenderness palpation.  Skin: No rashes seen.  Good turgor.  Positive for 1.5 cm in diameter abrasion noted to back of scalp.  No ecchymoses.  Neurologic: Motor intact.  Sensation intact.  No ataxia. No focal neurological deficits.  Psych: Appropriate, conversant    Labs/Imaging that has been ordered has been reviewed by myself.    I decided to obtain the patient's medical records.      MDM/ Differential Dx:    Emergent evaluation of a 43 yo male presenting for headache, neck pain and abrasion to scalp after a trip and fall at approximately 7:15 a.m. this morning.  Patient states he tripped over his tool box and hit his head on a brick wall.  Patient unsure whether he had loss of consciousness.  Tetanus up-to-date.  On exam pt is A&Ox3. VSS. Nonfebrile and nontoxic appearing. Breath sounds clear bilaterally. Mucous membranes pink and moist.  Tenderness to palpation to bilateral trapezius muscles.  No C, T, L-spine tenderness to palpation.  No step-offs appreciated.  1.5 cm in diameter abrasion noted to back of scalp.  Bleeding controlled.  No other signs of trauma.  Pt speaking in full sentences.  Steady gait appreciated. Cap refill < 3 seconds.      Differential diagnoses include but are not limited to concussion, abrasion, head injury, SAH, SDH, others.      I will get  imaging, medicate and reassess.  I discussed this case with my supervising physician.    ED Course as of 05/22/22 0858   Sun May 22, 2022   0850 CT negative for any acute abnormalities.  Wounds cleaned by nursing staff.  Patient and family updated on imaging results.  Advised will prescribe medications for pain.  Ice or heat for comfort.  Maintain movement and stretches.  Strict return to ED precautions discussed.  Follow-up with PCP as needed.  Patient and family verbalized understanding of this plan of care.  All questions and concerns addressed. [RZ]   0857 Patient is hemodynamically stable, vital signs are normal. Discharge instructions given. Return to ED precautions discussed. Follow up as directed. Pt verbalized understanding of this plan.  Pt is stable for discharge.  [RZ]      ED Course User Index  [RZ] Georgia Lind NP                 Diagnostic Impression:    1. Fall, initial encounter    2. Injury of head, initial encounter    3. Neck pain, acute    4. Abrasion of scalp, initial encounter         ED Disposition Condition    Discharge Stable          ED Prescriptions     Medication Sig Dispense Start Date End Date Auth. Provider    methocarbamoL (ROBAXIN) 750 MG Tab Take 1 tablet (750 mg total) by mouth 3 (three) times daily as needed (muscle strain). 15 tablet 5/22/2022 5/27/2022 Georgia Lind NP    naproxen (NAPROSYN) 500 MG tablet Take 1 tablet (500 mg total) by mouth 2 (two) times daily with meals. for 5 days 10 tablet 5/22/2022 5/27/2022 Georgia Lind NP    LIDOcaine (LIDODERM) 5 % Place 1 patch onto the skin once daily. 5 patch 5/22/2022  Georgia Lind NP    HYDROcodone-acetaminophen (NORCO) 5-325 mg per tablet Take 1 tablet by mouth every 4 (four) hours as needed for Pain. 18 tablet 5/22/2022 5/25/2022 Georgia Lind NP        Follow-up Information     Follow up With Specialties Details Why Contact [facility]    Angiodroid [facility]  Schedule an appointment as  soon as possible for a visit  As needed [address] [place] [id number]  [phone]               Georgia Lind, [place]  05/22/22 0899

## 2022-05-22 NOTE — DISCHARGE INSTRUCTIONS
You were seen and evaluated in the ER today.  Your workup while you were here is reassuring.  Your imaging showed no fractures or bleeds.  You will likely be sore for the next couple days.  We will prescribe you Robaxin, naproxen and Lidoderm patches for your pain.  We have prescribed you Norco for breakthrough pain.  Ice or heat for comfort.  Maintain movement and stretches.  Please follow-up with your PCP as needed.  Please return to the ED for any worsening symptoms such as chest pain, shortness of breath, fever not controlled with Tylenol or ibuprofen or uncontrolled pain.      Our goal in the emergency department is to always give you outstanding care and exceptional service. You may receive a survey by mail or e-mail in the next week regarding your experience in our ED. We would greatly appreciate your completing and returning the survey. Your feedback provides us with a way to recognize our staff who give very good care and it helps us learn how to improve when your experience was below our aspiration of excellence.

## 2022-08-03 DIAGNOSIS — M50.30 DEGENERATION, INTERVERTEBRAL DISC, CERVICAL: Primary | ICD-10-CM

## 2022-10-06 DIAGNOSIS — G43.009 MIGRAINE WITHOUT AURA AND WITHOUT STATUS MIGRAINOSUS, NOT INTRACTABLE: ICD-10-CM

## 2022-10-06 DIAGNOSIS — G93.0 ARACHNOID CYST: ICD-10-CM

## 2022-10-06 DIAGNOSIS — R56.9 SEIZURE-LIKE ACTIVITY: ICD-10-CM

## 2022-10-06 DIAGNOSIS — G45.8 OTHER SPECIFIED TRANSIENT CEREBRAL ISCHEMIAS: Primary | ICD-10-CM

## 2022-10-06 DIAGNOSIS — R41.3 MEMORY DEFICIT: ICD-10-CM

## 2022-10-24 ENCOUNTER — HOSPITAL ENCOUNTER (OUTPATIENT)
Dept: RADIOLOGY | Facility: HOSPITAL | Age: 42
Discharge: HOME | End: 2022-10-24
Attending: NURSE PRACTITIONER
Payer: MEDICAID

## 2022-10-24 ENCOUNTER — HOSPITAL ENCOUNTER (OUTPATIENT)
Dept: CARDIOLOGY | Facility: HOSPITAL | Age: 42
Discharge: HOME | End: 2022-10-24
Attending: NURSE PRACTITIONER
Payer: MEDICAID

## 2022-10-24 VITALS — BODY MASS INDEX: 27.28 KG/M2 | HEIGHT: 68 IN | WEIGHT: 180 LBS

## 2022-10-24 DIAGNOSIS — G93.0 ARACHNOID CYST: ICD-10-CM

## 2022-10-24 DIAGNOSIS — G43.009 MIGRAINE WITHOUT AURA AND WITHOUT STATUS MIGRAINOSUS, NOT INTRACTABLE: ICD-10-CM

## 2022-10-24 DIAGNOSIS — G45.8 OTHER SPECIFIED TRANSIENT CEREBRAL ISCHEMIAS: ICD-10-CM

## 2022-10-24 DIAGNOSIS — R41.3 MEMORY DEFICIT: ICD-10-CM

## 2022-10-24 DIAGNOSIS — R56.9 SEIZURE-LIKE ACTIVITY: ICD-10-CM

## 2022-10-24 PROCEDURE — 70544 MR ANGIOGRAPHY HEAD W/O DYE: CPT | Mod: TC

## 2022-10-24 PROCEDURE — 93306 TTE W/DOPPLER COMPLETE: CPT | Mod: 26,,, | Performed by: SPECIALIST

## 2022-10-24 PROCEDURE — 93306 TTE W/DOPPLER COMPLETE: CPT

## 2022-10-24 PROCEDURE — 70551 MRI BRAIN STEM W/O DYE: CPT | Mod: TC

## 2022-10-24 PROCEDURE — 93306 ECHO (CUPID ONLY): ICD-10-PCS | Mod: 26,,, | Performed by: SPECIALIST

## 2022-10-24 PROCEDURE — 93880 EXTRACRANIAL BILAT STUDY: CPT | Mod: TC

## 2022-10-28 LAB
AORTIC VALVE CUSP SEPERATION: 2.3 CM
AV INDEX (PROSTH): 1.02
AV MEAN GRADIENT: 5 MMHG
AV PEAK GRADIENT: 5 MMHG
AV VALVE AREA: 3.2 CM2
AV VELOCITY RATIO: 0.75
BSA FOR ECHO PROCEDURE: 1.98 M2
CV ECHO LV RWT: 0.28 CM
DOP CALC AO PEAK VEL: 1.09 M/S
DOP CALC AO VTI: 21.5 CM
DOP CALC LVOT AREA: 3.1 CM2
DOP CALC LVOT DIAMETER: 2 CM
DOP CALC LVOT PEAK VEL: 0.82 M/S
DOP CALC LVOT STROKE VOLUME: 68.77 CM3
DOP CALCLVOT PEAK VEL VTI: 21.9 CM
E WAVE DECELERATION TIME: 187 MS
E/A RATIO: 1.12
E/E' RATIO: 9.11 M/S
ECHO LV POSTERIOR WALL: 0.68 CM (ref 0.6–1.1)
EJECTION FRACTION: 70 %
FRACTIONAL SHORTENING: 40 % (ref 28–44)
INTERVENTRICULAR SEPTUM: 0.65 CM (ref 0.6–1.1)
IVRT: 113 MS
LEFT ATRIUM SIZE: 3.3 CM
LEFT INTERNAL DIMENSION IN SYSTOLE: 2.93 CM (ref 2.1–4)
LEFT VENTRICLE MASS INDEX: 53 G/M2
LEFT VENTRICULAR INTERNAL DIMENSION IN DIASTOLE: 4.91 CM (ref 3.5–6)
LEFT VENTRICULAR MASS: 104.27 G
LV LATERAL E/E' RATIO: 9.11 M/S
LV SEPTAL E/E' RATIO: 9.11 M/S
MV PEAK A VEL: 0.73 M/S
MV PEAK E VEL: 0.82 M/S
MV STENOSIS PRESSURE HALF TIME: 94 MS
MV VALVE AREA P 1/2 METHOD: 2.34 CM2
PISA TR MAX VEL: 2.26 M/S
RA PRESSURE: 3 MMHG
RIGHT VENTRICULAR END-DIASTOLIC DIMENSION: 1.46 CM
TDI LATERAL: 0.09 M/S
TDI SEPTAL: 0.09 M/S
TDI: 0.09 M/S
TR MAX PG: 20 MMHG
TV REST PULMONARY ARTERY PRESSURE: 23 MMHG

## 2022-11-29 DIAGNOSIS — R10.9 PAIN IN THE ABDOMEN: Primary | ICD-10-CM

## 2022-12-09 ENCOUNTER — HOSPITAL ENCOUNTER (OUTPATIENT)
Dept: RADIOLOGY | Facility: HOSPITAL | Age: 42
Discharge: HOME | End: 2022-12-09
Payer: MEDICAID

## 2022-12-09 DIAGNOSIS — R10.9 PAIN IN THE ABDOMEN: ICD-10-CM

## 2022-12-09 PROCEDURE — 76700 US EXAM ABDOM COMPLETE: CPT | Mod: TC,PO

## 2023-02-03 ENCOUNTER — HOSPITAL ENCOUNTER (OUTPATIENT)
Dept: RADIOLOGY | Facility: HOSPITAL | Age: 43
Discharge: HOME | End: 2023-02-03
Payer: MEDICAID

## 2023-02-03 DIAGNOSIS — M54.6 PAIN IN THORACIC SPINE: Primary | ICD-10-CM

## 2023-02-03 DIAGNOSIS — M54.6 PAIN IN THORACIC SPINE: ICD-10-CM

## 2023-02-03 PROCEDURE — 71045 X-RAY EXAM CHEST 1 VIEW: CPT | Mod: TC,PO

## 2023-02-03 PROCEDURE — 71100 X-RAY EXAM RIBS UNI 2 VIEWS: CPT | Mod: TC,PO,RT
